# Patient Record
Sex: FEMALE | Race: WHITE | Employment: OTHER | URBAN - METROPOLITAN AREA
[De-identification: names, ages, dates, MRNs, and addresses within clinical notes are randomized per-mention and may not be internally consistent; named-entity substitution may affect disease eponyms.]

---

## 2017-07-21 ENCOUNTER — TRANSFERRED RECORDS (OUTPATIENT)
Dept: HEALTH INFORMATION MANAGEMENT | Facility: CLINIC | Age: 72
End: 2017-07-21

## 2017-07-22 ENCOUNTER — TELEPHONE (OUTPATIENT)
Dept: ONCOLOGY | Facility: CLINIC | Age: 72
End: 2017-07-22

## 2017-07-24 NOTE — TELEPHONE ENCOUNTER
Received a call from patient asking me to review outside labs.    Release of information signed.    Called pt to discuss results.    Ann Pham

## 2017-07-25 ENCOUNTER — APPOINTMENT (OUTPATIENT)
Dept: GENERAL RADIOLOGY | Facility: CLINIC | Age: 72
DRG: 372 | End: 2017-07-25
Attending: INTERNAL MEDICINE
Payer: MEDICARE

## 2017-07-25 ENCOUNTER — HOSPITAL ENCOUNTER (INPATIENT)
Facility: CLINIC | Age: 72
LOS: 6 days | Discharge: HOME | DRG: 372 | End: 2017-07-31
Attending: INTERNAL MEDICINE | Admitting: INTERNAL MEDICINE
Payer: MEDICARE

## 2017-07-25 ENCOUNTER — TELEPHONE (OUTPATIENT)
Dept: ONCOLOGY | Facility: CLINIC | Age: 72
End: 2017-07-25

## 2017-07-25 ENCOUNTER — APPOINTMENT (OUTPATIENT)
Dept: MRI IMAGING | Facility: CLINIC | Age: 72
DRG: 372 | End: 2017-07-25
Attending: INTERNAL MEDICINE
Payer: MEDICARE

## 2017-07-25 DIAGNOSIS — E87.79 OTHER HYPERVOLEMIA: ICD-10-CM

## 2017-07-25 DIAGNOSIS — E87.6 HYPOKALEMIA: ICD-10-CM

## 2017-07-25 DIAGNOSIS — R50.9 FEVER, UNSPECIFIED FEVER CAUSE: Primary | ICD-10-CM

## 2017-07-25 LAB
ALBUMIN SERPL-MCNC: 2.3 G/DL (ref 3.4–5)
ALBUMIN UR-MCNC: 10 MG/DL
ALP SERPL-CCNC: 594 U/L (ref 40–150)
ALT SERPL W P-5'-P-CCNC: 350 U/L (ref 0–50)
AMYLASE SERPL-CCNC: 21 U/L (ref 30–110)
ANION GAP SERPL CALCULATED.3IONS-SCNC: 8 MMOL/L (ref 3–14)
APPEARANCE UR: CLEAR
AST SERPL W P-5'-P-CCNC: 132 U/L (ref 0–45)
BILIRUB SERPL-MCNC: 2.8 MG/DL (ref 0.2–1.3)
BILIRUB UR QL STRIP: ABNORMAL
BUN SERPL-MCNC: 9 MG/DL (ref 7–30)
CALCIUM SERPL-MCNC: 8.3 MG/DL (ref 8.5–10.1)
CHLORIDE SERPL-SCNC: 104 MMOL/L (ref 94–109)
CO2 SERPL-SCNC: 26 MMOL/L (ref 20–32)
COLOR UR AUTO: ABNORMAL
CREAT SERPL-MCNC: 0.5 MG/DL (ref 0.52–1.04)
CRP SERPL-MCNC: 77 MG/L (ref 0–8)
ERYTHROCYTE [DISTWIDTH] IN BLOOD BY AUTOMATED COUNT: 14.5 % (ref 10–15)
GFR SERPL CREATININE-BSD FRML MDRD: >90 ML/MIN/1.7M2
GLUCOSE SERPL-MCNC: 96 MG/DL (ref 70–99)
GLUCOSE UR STRIP-MCNC: NEGATIVE MG/DL
HCT VFR BLD AUTO: 34.5 % (ref 35–47)
HGB BLD-MCNC: 11.5 G/DL (ref 11.7–15.7)
HGB UR QL STRIP: NEGATIVE
KETONES UR STRIP-MCNC: 5 MG/DL
LEUKOCYTE ESTERASE UR QL STRIP: NEGATIVE
LIPASE SERPL-CCNC: 134 U/L (ref 73–393)
MCH RBC QN AUTO: 29.2 PG (ref 26.5–33)
MCHC RBC AUTO-ENTMCNC: 33.3 G/DL (ref 31.5–36.5)
MCV RBC AUTO: 88 FL (ref 78–100)
MUCOUS THREADS #/AREA URNS LPF: PRESENT /LPF
NITRATE UR QL: NEGATIVE
PH UR STRIP: 6.5 PH (ref 5–7)
PLATELET # BLD AUTO: 244 10E9/L (ref 150–450)
POTASSIUM SERPL-SCNC: 3.5 MMOL/L (ref 3.4–5.3)
PROT SERPL-MCNC: 5.7 G/DL (ref 6.8–8.8)
RBC # BLD AUTO: 3.94 10E12/L (ref 3.8–5.2)
RBC #/AREA URNS AUTO: 1 /HPF (ref 0–2)
SODIUM SERPL-SCNC: 138 MMOL/L (ref 133–144)
SP GR UR STRIP: 1.02 (ref 1–1.03)
SQUAMOUS #/AREA URNS AUTO: <1 /HPF (ref 0–1)
TRANS CELLS #/AREA URNS HPF: <1 /HPF (ref 0–1)
URN SPEC COLLECT METH UR: ABNORMAL
UROBILINOGEN UR STRIP-MCNC: 2 MG/DL (ref 0–2)
WBC # BLD AUTO: 5.5 10E9/L (ref 4–11)
WBC #/AREA URNS AUTO: 3 /HPF (ref 0–2)

## 2017-07-25 PROCEDURE — 99223 1ST HOSP IP/OBS HIGH 75: CPT | Mod: AI | Performed by: INTERNAL MEDICINE

## 2017-07-25 PROCEDURE — 83690 ASSAY OF LIPASE: CPT | Performed by: STUDENT IN AN ORGANIZED HEALTH CARE EDUCATION/TRAINING PROGRAM

## 2017-07-25 PROCEDURE — 82150 ASSAY OF AMYLASE: CPT | Performed by: STUDENT IN AN ORGANIZED HEALTH CARE EDUCATION/TRAINING PROGRAM

## 2017-07-25 PROCEDURE — 36415 COLL VENOUS BLD VENIPUNCTURE: CPT | Performed by: STUDENT IN AN ORGANIZED HEALTH CARE EDUCATION/TRAINING PROGRAM

## 2017-07-25 PROCEDURE — 86140 C-REACTIVE PROTEIN: CPT | Performed by: STUDENT IN AN ORGANIZED HEALTH CARE EDUCATION/TRAINING PROGRAM

## 2017-07-25 PROCEDURE — 80053 COMPREHEN METABOLIC PANEL: CPT | Performed by: STUDENT IN AN ORGANIZED HEALTH CARE EDUCATION/TRAINING PROGRAM

## 2017-07-25 PROCEDURE — 25000128 H RX IP 250 OP 636: Performed by: INTERNAL MEDICINE

## 2017-07-25 PROCEDURE — 71010 XR CHEST PORT 1 VW: CPT

## 2017-07-25 PROCEDURE — 81001 URINALYSIS AUTO W/SCOPE: CPT | Performed by: STUDENT IN AN ORGANIZED HEALTH CARE EDUCATION/TRAINING PROGRAM

## 2017-07-25 PROCEDURE — 85027 COMPLETE CBC AUTOMATED: CPT | Performed by: STUDENT IN AN ORGANIZED HEALTH CARE EDUCATION/TRAINING PROGRAM

## 2017-07-25 PROCEDURE — 74183 MRI ABD W/O CNTR FLWD CNTR: CPT

## 2017-07-25 PROCEDURE — 93010 ELECTROCARDIOGRAM REPORT: CPT | Performed by: INTERNAL MEDICINE

## 2017-07-25 PROCEDURE — A9270 NON-COVERED ITEM OR SERVICE: HCPCS | Mod: GY | Performed by: STUDENT IN AN ORGANIZED HEALTH CARE EDUCATION/TRAINING PROGRAM

## 2017-07-25 PROCEDURE — 25000132 ZZH RX MED GY IP 250 OP 250 PS 637: Mod: GY | Performed by: STUDENT IN AN ORGANIZED HEALTH CARE EDUCATION/TRAINING PROGRAM

## 2017-07-25 PROCEDURE — 25000128 H RX IP 250 OP 636: Performed by: STUDENT IN AN ORGANIZED HEALTH CARE EDUCATION/TRAINING PROGRAM

## 2017-07-25 PROCEDURE — 20000002 ZZH R&B BMT INTERMEDIATE

## 2017-07-25 PROCEDURE — A9585 GADOBUTROL INJECTION: HCPCS | Performed by: INTERNAL MEDICINE

## 2017-07-25 PROCEDURE — 93005 ELECTROCARDIOGRAM TRACING: CPT

## 2017-07-25 PROCEDURE — 40000141 ZZH STATISTIC PERIPHERAL IV START W/O US GUIDANCE

## 2017-07-25 PROCEDURE — 86140 C-REACTIVE PROTEIN: CPT | Performed by: INTERNAL MEDICINE

## 2017-07-25 RX ORDER — ONDANSETRON 2 MG/ML
4 INJECTION INTRAMUSCULAR; INTRAVENOUS EVERY 6 HOURS PRN
Status: DISCONTINUED | OUTPATIENT
Start: 2017-07-25 | End: 2017-07-31 | Stop reason: HOSPADM

## 2017-07-25 RX ORDER — LIDOCAINE 40 MG/G
CREAM TOPICAL
Status: DISCONTINUED | OUTPATIENT
Start: 2017-07-25 | End: 2017-07-25

## 2017-07-25 RX ORDER — LIDOCAINE 40 MG/G
CREAM TOPICAL
Status: DISCONTINUED | OUTPATIENT
Start: 2017-07-25 | End: 2017-07-31 | Stop reason: HOSPADM

## 2017-07-25 RX ORDER — IBUPROFEN 200 MG
200 TABLET ORAL EVERY 6 HOURS PRN
Status: DISCONTINUED | OUTPATIENT
Start: 2017-07-25 | End: 2017-07-26

## 2017-07-25 RX ORDER — GADOBUTROL 604.72 MG/ML
7.5 INJECTION INTRAVENOUS ONCE
Status: COMPLETED | OUTPATIENT
Start: 2017-07-25 | End: 2017-07-25

## 2017-07-25 RX ORDER — SODIUM CHLORIDE 9 MG/ML
INJECTION, SOLUTION INTRAVENOUS CONTINUOUS
Status: DISCONTINUED | OUTPATIENT
Start: 2017-07-25 | End: 2017-07-28

## 2017-07-25 RX ORDER — IBUPROFEN 200 MG
200 TABLET ORAL EVERY 4 HOURS PRN
Status: DISCONTINUED | OUTPATIENT
Start: 2017-07-25 | End: 2017-07-25

## 2017-07-25 RX ORDER — IBUPROFEN 400 MG/1
400 TABLET, FILM COATED ORAL EVERY 4 HOURS PRN
Status: DISCONTINUED | OUTPATIENT
Start: 2017-07-25 | End: 2017-07-25

## 2017-07-25 RX ORDER — NALOXONE HYDROCHLORIDE 0.4 MG/ML
.1-.4 INJECTION, SOLUTION INTRAMUSCULAR; INTRAVENOUS; SUBCUTANEOUS
Status: DISCONTINUED | OUTPATIENT
Start: 2017-07-25 | End: 2017-07-31 | Stop reason: HOSPADM

## 2017-07-25 RX ORDER — DOXYCYCLINE 100 MG/1
100 CAPSULE ORAL 2 TIMES DAILY
Status: DISCONTINUED | OUTPATIENT
Start: 2017-07-25 | End: 2017-07-26

## 2017-07-25 RX ADMIN — DOXYCYCLINE HYCLATE 100 MG: 100 CAPSULE ORAL at 20:32

## 2017-07-25 RX ADMIN — SODIUM CHLORIDE: 9 INJECTION, SOLUTION INTRAVENOUS at 12:22

## 2017-07-25 RX ADMIN — GADOBUTROL 7.5 ML: 604.72 INJECTION INTRAVENOUS at 18:49

## 2017-07-25 RX ADMIN — SODIUM CHLORIDE 1000 ML: 9 INJECTION, SOLUTION INTRAVENOUS at 21:18

## 2017-07-25 RX ADMIN — IBUPROFEN 200 MG: 200 TABLET, FILM COATED ORAL at 15:09

## 2017-07-25 RX ADMIN — IBUPROFEN 200 MG: 200 TABLET, FILM COATED ORAL at 15:58

## 2017-07-25 RX ADMIN — Medication 1 MG: at 21:17

## 2017-07-25 ASSESSMENT — PAIN DESCRIPTION - DESCRIPTORS: DESCRIPTORS: ACHING

## 2017-07-25 NOTE — TELEPHONE ENCOUNTER
I received a call from patient with increasing fevers, body aches, chills.  These have been going on for the last 10+ days.  She has been on doxycycline since 7/20 7pm for presume anaplasmosis given leukopenia.  Recent travel to [place] and [place] (June for > 2 weeks), [place] (in July for > 10 days)  No improvement in symptoms.  Labs drawn yesterday at outside PCP physician office indicate plt wbc count 4.8  hgb 12.6 plt 215 (were 157)  ANC 3.6  ALC 0.4    LFTs returned this morning Total bilirubin 2.5 direct bilirubin 2.0      Last week. Tbili was normal.  Transaminases relatively stable.    Liver US last week at [facility] unremarkable.  Ehrlichia/anaplasmosis pcr negative  Smears - pending.      Discussed admitting patient with persistent fevers, increase bilirubin.    Ann Pham

## 2017-07-25 NOTE — H&P
"Internal Medicine History and Physical  Date of service: 2017  Attending physician: Vanessa Cuello       Patient: Tameka Hebert      : 1945      MRN: [id number]          Chief complaint:   Fevers           History of Present Illness:   Tameka Hebert is a 72 year old previously healthy female with a no significant past medical history who presented to [facility] w/ a ten day history of fevers and chills. Patient was with her family hiking in [place] two weeks ago when symptoms first developed. Patient reports going for a seven mile hike on  with her family in [place]. After the hike, she developed a headache, thought that she was dehydrated and drank some fluids. The next day (), the patient felt more tired than her baseline, reporting that later that afternoon she needed to nap which is \"something I never do.\" Upon awaking from her nap, patient felt feverish and with chills.     Her fevers and chills occur \"like clockwork\" ever 4-6 hours. She visited her PCP on  then returned to her PCP on ; CBC and LFTs at this 2nd visit were elevation and she was started on a 10 day course of doxycycline for a presumed tick borne illness. She continued to feel poorly and then presented to the [facility] on 17 after an episode of non-bilious, non-bloody vomiting. At the ED she was tested for lyme, hep A, B, C, anaplasma, erlichia, CMV, and EBV, all which were negative. Abdominal US at this time showed an incidental cyst in the left lobe of the liver. She was discharged home with recommendations to maintain hydration, continue ibuprofen for cyclical fevers, and continue doxycycline.     However, she did not improve over the weekend and returned to her PCP on  and liver function tests were again elevated (, , alk phos 717, and total bili 2.5 with direct bili 2.0). Her PCP subsequently admitted her directly to [facility].      She is also experiencing headaches that wax and wane with her " "fevers. The headaches are located at the back of her head bilaterally. Denies neck stiffness. She has also developed some arthralgias, mostly localized to the knee joints which is worse when the fever occurs. Her appetite has significantly decreased and she feels her po intake is not adequate.      Recent travel includes [place] from 07/08/17-06/17/17 and [place] from 06/9/17-07/23/17. She went hiking in both locations. She remembers getting a few mosquito bites but denies any new rash or skin lesions. She says ticks were present in [place] but she never found any on her self. No out of country travel recently. Denies sick contacts except her  who was treated treated for pneumonia while they were in [place]. Reports being up to date on her immunizations.  Denies weight loss, dysuria, hematuria, melena, hematochezia, light headedness, heart palpitations, cough, abdoiminal pain, lymphadenopathy, or paraesthesias.          Review of Symptoms:   10-point ROS reviewed, & found negative w/ exceptions noted in the HPI.          Past Medical History:   Denies significant past medical history besides a recent shoulder surgery for a torn rotator cuff.          Allergies:     Allergies   Allergen Reactions     Codeine Nausea and Vomiting             Outpatient Medications:   No regular home medications. Was taking doxycycline as an outpatient and on day 4.5/10 on admission.           Family History:   Mother with breast cancer and father with HTN.           Social History:    with 4 children and 7 grandchildren. Denies smoking. Is a social drinker. Has no pets but often takes care of her kids' dogs. Lives a very active lifestyle.           Physical Exam:   /64 (BP Location: Left arm)  Pulse 86  Temp 101.3  F (38.5  C) (Oral)  Resp 18  Ht 1.8 m (5' 10.87\")  Wt 65.7 kg (144 lb 13.5 oz)  SpO2 96%  BMI 20.28 kg/m2    General: Alert, not in respiratory or painful distress, appears mildly " uncomfortable   Head: NC/AT  EENT: anicteric sclera, PERRL, EOMI, MMM, OP unobstructed, w/o erythema/discharge;   Neck: no cervical LAD, FROM, no nuchal rigidity; chin to chest intact.   CV: RRR, nl S1/S2, no S3/S4 appreciated, no m/r/g  Lungs: CTAB, no wheezing/crackles, no cough  Abd: Non-distended. Bowel sounds presents; liver mildly enlarged on percussion but unable to palpate the liver edge. No splenomegaly.   Ext: WWP,  intact & symmetric 3+ pulses (radial, DP)  Skin: no rashes, cyanosis, or jaundice  Neuro: AOx3, CN II-XII intact and symmetric, muscle strength 5/5 bilaterally.           Data:   Labs (all laboratory studies reviewed by me):   Most pertinent for:    CMPNo lab results found in last 7 days.  CBC  Recent Labs  Lab 07/25/17  1324   WBC 5.5   RBC 3.94   HGB 11.5*   HCT 34.5*   MCV 88   MCH 29.2   MCHC 33.3   RDW 14.5                Assessment/Plan:   72 year old previously healthy female with no significant PMHx presenting w/ a 10 day history of cyclical fevers and chills.     #. Fever of unknown origin   Hemodynamically stable; does not meet SIRS or sepsis criteria currently. Workup at OSH negative for: lyme, anaplasma, ehrlichia, HIV, hepatitis panel, babesia, mononucleosis, [place], EBV/CMV. DDx includes infectious causes and rheumatologic causes. Vector borne disease still high on the differential 2/2 travel history, fevers/arthralgias, and elevated LFTs and AP. However, rheumatologic/connective tissue etiologies are also possible. Polymyalgia rheumatica and adult Still's disease are often associated with fever, but this patient lacks and changes in vision and muscle weakness seen in polymyalgia and laks the rash commonly seen in Still's disease. Lastly, malignancy such as those of reticuloendothelial origin (ie lymphoma, leukemia) is possible but given lack of additional b-symptoms or physical exam findings (ie no weight loss, progressive fatigue, lymphadenopathy) makes this less  likely. Additionally, her WBC has not been elevated over the course of the week but has had lymphopenia and thrombocytopenia at an OSH; this has resolved on her admission but makes an overt infectious cause 2/2 bacteria less likely. Drugs are another cause of fevers but this patient has no home medications.   - BETH, RF pending  - CRP, ESR pending   - CK pending  - LDH pending   - Quant gold pending   - UA w/ reflex to culture pending  - BCx pending   - IVF with  cc/hr   - ID consulted: coxiella, CMV, EBV, francisella tularensis, rickettsia, repeat hepatic panel, pro-calcitonin, and parasite strain ordered   - GI consulted: MRCP planned for 07/25/17 in evening   - Ibuprofen prn fevers >101 degrees; will need to map fever curve   - Continue doxycycline 100mg BID for total of 10 day course (5 days completed to date, end-date 07/30/17)     FEN:  -- IVF:  cc/hr  -- Labs: Routine  -- Diet: Full    PPX:   -- Ulcer: No PPI indicated at this time  -- VTE: Mechanical prophylaxis     Code Status: FULL    Dispo: Admit to inpatient for workup of fevers of unknown origin     Patient seen and discussed with Dr. Cuello, who is in agreement with my assessment and recommedations. Please, see staff addendum for changes/additions to the plan.    Cee Partida MD/MPH  Internal Medicine/Dermatology  PGY-1  [phone]

## 2017-07-25 NOTE — PROGRESS NOTES
Date of call: 7/25/2017    Time of call: 7:51 AM      Reason for Transfer:  Further diagnostic work up, management, and consultation for specialized care  Diagnosis: Fever of Unknown Origin    Outside Records: available in Care Everywhere    Stability of Patient: Patient is vitally stable, with no critical labs, and will likely remain stable throughout the transfer process    Transferring facility/location: coming from home    Expected Time of Arrival: pending bed availability    Recommendations for Management and Stabilization: None Given    HPI obtained from transferring provider 73 yo previously healthy woman  With 10 days of fever and chills, now with transaminase elevation and hyperbilirubinemia. Outpatient w/u including Abdominal CT, ehrlichia and anaplasmosis PCRs and Blood Cultures have been unremarkable to date.  Failed a course of doxycycline. Vitally stable and mentating normally, but recent increase in LFTs prompting further investigation.      Bernard Carlson MD  Internal Medicine and Pediatrics Hospitalist

## 2017-07-25 NOTE — IP AVS SNAPSHOT
MRN:[id number]                      After Visit Summary   7/25/2017    Tameka Hebert    MRN: [id number]           Thank you!     Thank you for choosing [facility] for your care. Our goal is always to provide you with excellent care. Hearing back from our patients is one way we can continue to improve our services. Please take a few minutes to complete the written survey that you may receive in the mail after you visit with us. Thank you!        Patient Information     Date Of Birth          1945        Designated Caregiver       Most Recent Value    Caregiver    Will someone help with your care after discharge? yes    Name of designated caregiver Andrew    Phone number of caregiver [phone]    Caregiver address [address], [place]      About your hospital stay     You were admitted on:  July 25, 2017 You last received care in the:  [address] [facility]    You were discharged on:  July 31, 2017        Reason for your hospital stay       You were admitted for fever of unknown origin. You came in on doxycycline since your fever was thought to be from a tick-borne disease. However, all the tests for tick-born illnesses were negative so the doxycycline was stopped and you were switched to different antibiotics. Infectious disease, gastroenterology, and hepatology were consulted during your stay. Infection due to a non-typhoidal strain of Salmonella was ultimately thought to be the cause. Your liver enzymes down-trended during your stay and fevers became less frequent. You received IV fluids early on in your stay for dehydration and are now on lasix to relieve some of the fluid that accumulated. You are going home on antibiotics by mouth.                  Who to Call     For medical emergencies, please call [phone].  For non-urgent questions about your medical care, please call your primary care provider or clinic, [phone]          Attending Provider     Provider Specialty     "Bernard Carlson MD Pediatrics    Vanessa Cuello MD Internal Medicine       Primary Care Provider Office Phone # Fax #    Obed Salgado [phone]      After Care Instructions     Activity       Your activity upon discharge: activity as tolerated            Diet       Follow this diet upon discharge: Regular                  Follow-up Appointments     Follow Up and recommended labs and tests       Follow up with primary care provider, Obed Salgado, within 7 days for hospital follow- up.  The following labs/tests are recommended: CBC and CMP.                  Future tests that were ordered for you     CRP inflammation           Comprehensive metabolic panel                 Pending Results     Date and Time Order Name Status Description    7/31/2017 0150 Bartonella Species by PCR In process     7/30/2017 2353 B Henselae antibody IgG and IgM In process     7/30/2017 2353 Brucella species antibody In process     7/30/2017 0507 Blood culture Preliminary     7/28/2017 0822 EKG 12-lead, complete Preliminary     7/28/2017 0547 Blood culture Preliminary     7/27/2017 1041 Jessica Barr Virus Qualitative PCR In process     7/25/2017 2200 Blood culture Preliminary             Statement of Approval     Ordered          07/31/17 1518  I have reviewed and agree with all the recommendations and orders detailed in this document.  EFFECTIVE NOW     Approved and electronically signed by:  Cee Partida MD             Admission Information     Date & Time Provider Department Dept. Phone    7/25/2017 Vanessa Cuello MD [address] [facility] [place] [phone]      Your Vitals Were     Blood Pressure Pulse Temperature Respirations Height Weight    126/76 (BP Location: Right arm) 85 100.3  F (37.9  C) (Temporal) 18 1.8 m (5' 10.87\") 68.4 kg (150 lb 14.4 oz)    Pulse Oximetry BMI (Body Mass Index)                95% 21.13 kg/m2          MyChart Information     [facility] gives you secure access to your electronic " health record. If you see a primary care provider, you can also send messages to your care team and make appointments. If you have questions, please call your primary care clinic.  If you do not have a primary care provider, please call [phone] and they will assist you.        Care EveryWhere ID     This is your Care EveryWhere ID. This could be used by other organizations to access your [facility] medical records  [id number]        Equal Access to Services     LARRY VARGAS : Alicja López, wanajmada mariposaadaha, qaybta kaalmada maribel, sandra yeung . So [facility] [phone].    ATENCIÓN: Si christellela espradha, tiene a stern disposición servicios gratuitos de asistencia lingüística. Abelardo al [phone].    We comply with applicable federal civil rights laws and [place] laws. We do not discriminate on the basis of race, color, national origin, age, disability sex, sexual orientation or gender identity.               Review of your medicines      START taking        Dose / Directions    ciprofloxacin 500 MG tablet   Commonly known as:  CIPRO        Dose:  500 mg   Take 1 tablet (500 mg) by mouth 2 times daily   Quantity:  17 tablet   Refills:  0       furosemide 20 MG tablet   Commonly known as:  LASIX   Used for:  Other hypervolemia        Dose:  20 mg   Take 1 tablet (20 mg) by mouth daily for 5 days   Quantity:  5 tablet   Refills:  0       potassium chloride 20 MEQ Packet   Commonly known as:  KLOR-CON   Used for:  Hypokalemia        Dose:  20 mEq   Take 20 mEq by mouth daily   Quantity:  5 tablet   Refills:  0       ursodiol 250 MG tablet   Commonly known as:  ACTIGALL        Dose:  250 mg   Take 1 tablet (250 mg) by mouth 2 times daily for 5 days   Quantity:  10 tablet   Refills:  0         STOP taking     DOXYCYCLINE HYCLATE PO           IBUPROFEN PO                Where to get your medicines      These medications were sent to [facility] -  [place], [place] - [address]  [address] [address]     Phone:  [phone]     ciprofloxacin 500 MG tablet    furosemide 20 MG tablet    potassium chloride 20 MEQ Packet    ursodiol 250 MG tablet                Protect others around you: Learn how to safely use, store and throw away your medicines at www.disposemymeds.org.             Medication List: This is a list of all your medications and when to take them. Check marks below indicate your daily home schedule. Keep this list as a reference.      Medications           Morning Afternoon Evening Bedtime As Needed    ciprofloxacin 500 MG tablet   Commonly known as:  CIPRO   Take 1 tablet (500 mg) by mouth 2 times daily                                furosemide 20 MG tablet   Commonly known as:  LASIX   Take 1 tablet (20 mg) by mouth daily for 5 days                                potassium chloride 20 MEQ Packet   Commonly known as:  KLOR-CON   Take 20 mEq by mouth daily                                ursodiol 250 MG tablet   Commonly known as:  ACTIGALL   Take 1 tablet (250 mg) by mouth 2 times daily for 5 days   Last time this was given:  250 mg on 7/31/2017  8:25 AM

## 2017-07-25 NOTE — IP AVS SNAPSHOT
[address] [address]    Phone:  [phone]    Fax:  [phone]                                       After Visit Summary   7/25/2017    Tameka Hebert    MRN: [id number]           After Visit Summary Signature Page     I have received my discharge instructions, and my questions have been answered. I have discussed any challenges I see with this plan with the nurse or doctor.    ..........................................................................................................................................  Patient/Patient Representative Signature      ..........................................................................................................................................  Patient Representative Print Name and Relationship to Patient    ..................................................               ................................................  Date                                            Time    ..........................................................................................................................................  Reviewed by Signature/Title    ...................................................              ..............................................  Date                                                            Time

## 2017-07-25 NOTE — PLAN OF CARE
Problem: Goal Outcome Summary  Goal: Goal Outcome Summary  Outcome: No Change  Pt admitted from home mid-morning. Tmax 100.9.  Other VSS. C/o headache- given ibuprofen w/ relief. Got MRCP and Chest XR.  Awaiting EKG and UA/UC this evening.  GI and ID consults tomorrow- orders for lab draws placed. Doxycylcine ordered for this evening.  Also prn melatonin ordered for HS.  Pt denies further nursing needs at this time.  Continue POC.

## 2017-07-25 NOTE — H&P
"[facility]    Internal Medicine History and Physical - [facility] Service       Date of Admission:  7/25/2017    Chief Complaint   Recurrent fevers    History is obtained from the patient and from chart review.     History of Present Illness   Tameka Hebert is an otherwise healthy 72 year old female with a recent travel history to [place] and [place] who presents with a 2 week onset of cyclical fevers. Patient was with her family hiking in [place] two weeks ago when symptoms first developed. Patient reports going for a seven mile hike on 7/8 with her family in [place]. After the hike, she developed a headache, thought that she was dehydrated and drank some fluids. The next day (7/9), the patient felt more tired than her baseline, reporting that later that afternoon she needed to nap which is \"something I never do.\" Upon awaking from her nap, patient felt feverish and with chills. Fevers and chills continued in a cyclical manner, ocurring every 4-6 hours with chills following after every febrile episode. Patient also shares that she has been sweating along with the fevers. On 7/17, the patient was not feeling any better and visited her primary care physician with the understanding that she was most likely dealing with a viral illness. Patient continued to not improve and then presented back to her PCP on Thursday 7/20 in which a CBC and liver enzyme tests were drawn and patient was started on doxycycline for possible tickborne infection. Liver enzymes returned elevated (, , alk phos 465) and then on 7/21, she presented to the ED at [facility] with one episode of nonbloody nonbilious vomiting. ED tested for lyme, hep A, B, C, anaplasma, erlichia, CMV, and EBV, all which were negative. An AB-US was performed which was unremarkable. Patient was discharged to home with recommendations to maintain hydration, continue ibuprofen for cyclical fevers, and continue " doxycycline course. Patient continued to not improve over the weekend and returned to her PCP on 7.24 where liver enzymes were again elevated (, , alk phos 717, and total bili 2.5 with direct bili 2.0). Given concern for elevated liver enzymes in the setting of cyclical fever with chills and sweats, patient was directly admitted to the hospital here.     Patient denies any recent sick contacts other than her  who developed CAP while in [place]. Denies any recent animal exposures or insect bites other than one mosquito bite on her abdomen. She did note that when she was in [place], she was doing a lot of yard work and found many ticks on her grandkids though none on herself. No international travel history, recent change in diet, or new environmental exposures. Denies weight loss, dysuria, hematuria, melena, hematochezia, light headedness, heart palpitations, cough, abdoiminal pain, lymphadenopathy, or paraesthesias. Patient did say that within the last few days she has developed worsening headaches that wax and wane with each fever cycle. Also shared that with each fever cycle, she will occasionally experience shooting jolts of left sided chest pain that also resolve after the fever. She has also developed some arthralgias, mostly localized to the knee joints which is worse when the fever occurs. Appetite has decreased over these two weeks along with fluid intake. Patient was feeling overwhelmed during the interview since she has been otherwise healthy before this recent event and has not received a clear idea as to what keep causing theses fevers.     Review of Systems   Complete ROS of systems was negative other than what was previously discussed in the HPI.     Past Medical History    Osteopenia    Past Surgical History   Left Rotator Cuff Repair - 2016    Social History   Lives near [facility], is , has 4 children and 7 grandchildren. She leads a very active lifestyle and  has no major health complaints. At baseline, patient walks three miles a day. She enjoys being outdoors and active with her grandchildren.     Family History   Sister diagnosed with breast cancer - age 70   Mother diagnosed with breast cancer - age 80   Father - HTN/kidney failure, Hx of smoking     Prior to Admission Medications   Prior to Admission Medications   Prescriptions Last Dose Informant Patient Reported? Taking?   DOXYCYCLINE HYCLATE PO 7/24/2017 at Unknown time  Yes Yes   Sig: Take 100 mg by mouth 2 times daily   IBUPROFEN PO 7/25/2017 at 0300  Yes Yes   Sig: Take 400 mg by mouth every 6 hours as needed for moderate pain      Facility-Administered Medications: None     Allergies   Allergies   Allergen Reactions     Codeine Nausea and Vomiting       Physical Exam   Vital Signs: Temp: 100.7  F (38.2  C) Temp src: Oral BP: 116/65 Pulse: 89   Resp: 18 SpO2: 96 % O2 Device: None (Room air)    Weight: 144 lbs 13.48 oz    General Appearance: alert and oriented x3, conversant, very articulate in sharing the recent details of her course of illness   Eyes: anicteric, PERRLA, EOM in tact   HEENT: no posterior pharyngeal erythema, mucous membranes moist, no maxillary or frontal sinus tenderness, hearing grossly in tact, no cervical lymphadenopathy  Respiratory: CTAB, appropriate airflow in an out of lungs   Cardiovascular: normal S1 and S2, no r/m/g, RRR  GI: nontender to palptation, nondistended, soft, +BSx4  Lymph/Hematologic: no lymphadenopathy appreciated  Genitourinary: not examined  Skin: no rashes, bites, or bruises noted on back, chest, abdomen, or extremities  Neurologic: CN II-XII intact, 5/5 strength in upper and lower limbs, vibratory/position sense not assessed, gait not assessed    Assessment & Plan   Tameka Hebert is a previously healthy 72 year old female admitted on 7/25/2017 with a two week history of cyclical fevers and elevated LFTs which began after recent travel to [place] coupled  with arthralgias, chills, and sweats.     #fever of unknown origin with elevated LFTs: differential diagnosis includes infectious vs. autoimmune vs. Malignancy. Infectious seems like the most likely etiology given the acute onset of the patient's presentation when in [place] along with a negative history for any other symptoms prior to her travel. Both her stay in [place] and [place] could have been likely sources for possible infections. However, given recent history of visits to [facility]CP and [facility] and negative workup so far for lyme, anaplasma, ehrlichia, HIV, Hep panel, and no report of improvement on doxycycline since beginning the course on 7/20, infectious etiology is uncertain. Less likely etiologies include autoimmune or possible malignancy, though wasn't able to appreciate any rashes, or lymphadenopathy.  1) CBC with diff and CMP  2) three blood cultures, to be drawn when patient has fever   3) BETH, Rf, ESR, CRP, CPK, Quant gold, procal, and LDH labs ordered  4) GI and ID consult     Diet: Room Service  Regular Diet Adult  Fluids: NS   DVT Prophylaxis: mechanical prophylaxis   Code Status: full code     Disposition Plan   Expected discharge: likely 3 days pending return of labs and information obtained from GI and ID consults.        Entered: Romero Lopez 07/25/2017, 4:43 PM   Information in the above section will display in the discharge planner report.    The patient was discussed with Dr. Cuello and Dr. Susanna Lopez  [address]   Pager: [phone]  Please see sticky note for cross cover information      Data   Data     Recent Labs  Lab 07/25/17  1324   WBC 5.5   HGB 11.5*   MCV 88         POTASSIUM 3.5   CHLORIDE 104   CO2 26   BUN 9   CR 0.50*   ANIONGAP 8   ROSANA 8.3*   GLC 96   ALBUMIN 2.3*   PROTTOTAL 5.7*   BILITOTAL 2.8*   ALKPHOS 594*   *   *     No results found for this or any previous visit (from the past 24  hour(s)).

## 2017-07-25 NOTE — LETTER
Transition Communication Hand-off for Care Transitions to Next Level of Care Provider    Name: Tameka Hebert  MRN #: [id number]  Primary Care Provider: Obed Salgado     Primary Clinic: [facility] [address] [place] [id number]     Reason for Hospitalization:  fever   elevated billirubin  Fever  Admit Date/Time: 7/25/2017  9:03 AM  Discharge Date: 7/31/2017  Payor Source: Payor: BCBS / Plan: BCBS PLATINUM BLUE / Product Type: PPO /          Reason for Communication Hand-off Referral: Other Continuity of care       Concern for non-adherence with plan of care:   No  Follow-up specialty is recommended: No    Follow-up plan:  No future appointments.          Key Recommendations:    Follow up with primary care provider, Obed Salgado, within 7 days for hospital follow- up.  The following labs/tests are recommended: CBC and CMP.     Katherin Zaidi  RN Care Coordinator  [phone]  AVS/Discharge Summary is the source of truth; this is a helpful guide for improved communication of patient story

## 2017-07-26 ENCOUNTER — APPOINTMENT (OUTPATIENT)
Dept: NUCLEAR MEDICINE | Facility: CLINIC | Age: 72
DRG: 372 | End: 2017-07-26
Attending: INTERNAL MEDICINE
Payer: MEDICARE

## 2017-07-26 LAB
ALBUMIN SERPL-MCNC: 2 G/DL (ref 3.4–5)
ALP SERPL-CCNC: 534 U/L (ref 40–150)
ALT SERPL W P-5'-P-CCNC: 308 U/L (ref 0–50)
ANION GAP SERPL CALCULATED.3IONS-SCNC: 11 MMOL/L (ref 3–14)
AST SERPL W P-5'-P-CCNC: 145 U/L (ref 0–45)
BILIRUB DIRECT SERPL-MCNC: 2.4 MG/DL (ref 0–0.2)
BILIRUB SERPL-MCNC: 3.1 MG/DL (ref 0.2–1.3)
BUN SERPL-MCNC: 11 MG/DL (ref 7–30)
CALCIUM SERPL-MCNC: 7.9 MG/DL (ref 8.5–10.1)
CHLORIDE SERPL-SCNC: 104 MMOL/L (ref 94–109)
CK SERPL-CCNC: 16 U/L (ref 30–225)
CMV IGM SERPL QL IA: NORMAL AI (ref 0–0.8)
CO2 SERPL-SCNC: 20 MMOL/L (ref 20–32)
CREAT SERPL-MCNC: 0.53 MG/DL (ref 0.52–1.04)
CRP SERPL-MCNC: 69 MG/L (ref 0–8)
EBV VCA IGM SER QL IA: NORMAL AI (ref 0–0.8)
ERYTHROCYTE [SEDIMENTATION RATE] IN BLOOD BY WESTERGREN METHOD: 27 MM/H (ref 0–30)
GFR SERPL CREATININE-BSD FRML MDRD: ABNORMAL ML/MIN/1.7M2
GLUCOSE SERPL-MCNC: 81 MG/DL (ref 70–99)
INTERPRETATION ECG - MUSE: NORMAL
LDH SERPL L TO P-CCNC: 181 U/L (ref 81–234)
MICRO REPORT STATUS: NORMAL
PARASITE SPEC INSPECT: NORMAL
POTASSIUM SERPL-SCNC: 3.7 MMOL/L (ref 3.4–5.3)
PROCALCITONIN SERPL-MCNC: 0.64 NG/ML
PROT SERPL-MCNC: 5 G/DL (ref 6.8–8.8)
SODIUM SERPL-SCNC: 135 MMOL/L (ref 133–144)
SPECIMEN SOURCE: NORMAL

## 2017-07-26 PROCEDURE — 86039 ANTINUCLEAR ANTIBODIES (ANA): CPT | Performed by: INTERNAL MEDICINE

## 2017-07-26 PROCEDURE — 86665 EPSTEIN-BARR CAPSID VCA: CPT | Performed by: INTERNAL MEDICINE

## 2017-07-26 PROCEDURE — 25000132 ZZH RX MED GY IP 250 OP 250 PS 637: Mod: GY | Performed by: STUDENT IN AN ORGANIZED HEALTH CARE EDUCATION/TRAINING PROGRAM

## 2017-07-26 PROCEDURE — 78226 HEPATOBILIARY SYSTEM IMAGING: CPT

## 2017-07-26 PROCEDURE — 20000002 ZZH R&B BMT INTERMEDIATE

## 2017-07-26 PROCEDURE — 80076 HEPATIC FUNCTION PANEL: CPT | Performed by: INTERNAL MEDICINE

## 2017-07-26 PROCEDURE — 86757 RICKETTSIA ANTIBODY: CPT | Performed by: INTERNAL MEDICINE

## 2017-07-26 PROCEDURE — 86638 Q FEVER ANTIBODY: CPT | Performed by: INTERNAL MEDICINE

## 2017-07-26 PROCEDURE — 86431 RHEUMATOID FACTOR QUANT: CPT | Performed by: INTERNAL MEDICINE

## 2017-07-26 PROCEDURE — 34300033 ZZH RX 343: Performed by: INTERNAL MEDICINE

## 2017-07-26 PROCEDURE — 25000128 H RX IP 250 OP 636: Performed by: INTERNAL MEDICINE

## 2017-07-26 PROCEDURE — 82550 ASSAY OF CK (CPK): CPT | Performed by: INTERNAL MEDICINE

## 2017-07-26 PROCEDURE — A9270 NON-COVERED ITEM OR SERVICE: HCPCS | Mod: GY | Performed by: STUDENT IN AN ORGANIZED HEALTH CARE EDUCATION/TRAINING PROGRAM

## 2017-07-26 PROCEDURE — 83615 LACTATE (LD) (LDH) ENZYME: CPT | Performed by: INTERNAL MEDICINE

## 2017-07-26 PROCEDURE — 25000128 H RX IP 250 OP 636: Performed by: STUDENT IN AN ORGANIZED HEALTH CARE EDUCATION/TRAINING PROGRAM

## 2017-07-26 PROCEDURE — 87207 SMEAR SPECIAL STAIN: CPT | Performed by: INTERNAL MEDICINE

## 2017-07-26 PROCEDURE — 87015 SPECIMEN INFECT AGNT CONCNTJ: CPT | Performed by: INTERNAL MEDICINE

## 2017-07-26 PROCEDURE — 40000141 ZZH STATISTIC PERIPHERAL IV START W/O US GUIDANCE

## 2017-07-26 PROCEDURE — 84145 PROCALCITONIN (PCT): CPT | Performed by: INTERNAL MEDICINE

## 2017-07-26 PROCEDURE — 86645 CMV ANTIBODY IGM: CPT | Performed by: INTERNAL MEDICINE

## 2017-07-26 PROCEDURE — 99232 SBSQ HOSP IP/OBS MODERATE 35: CPT | Mod: GC | Performed by: INTERNAL MEDICINE

## 2017-07-26 PROCEDURE — 87040 BLOOD CULTURE FOR BACTERIA: CPT | Performed by: INTERNAL MEDICINE

## 2017-07-26 PROCEDURE — 80048 BASIC METABOLIC PNL TOTAL CA: CPT | Performed by: INTERNAL MEDICINE

## 2017-07-26 PROCEDURE — 85652 RBC SED RATE AUTOMATED: CPT | Performed by: INTERNAL MEDICINE

## 2017-07-26 PROCEDURE — A9537 TC99M MEBROFENIN: HCPCS | Performed by: INTERNAL MEDICINE

## 2017-07-26 PROCEDURE — 86668 FRANCISELLA TULARENSIS: CPT | Performed by: INTERNAL MEDICINE

## 2017-07-26 PROCEDURE — 86140 C-REACTIVE PROTEIN: CPT | Performed by: INTERNAL MEDICINE

## 2017-07-26 PROCEDURE — 40000556 ZZH STATISTIC PERIPHERAL IV START W US GUIDANCE

## 2017-07-26 RX ORDER — IBUPROFEN 400 MG/1
400 TABLET, FILM COATED ORAL EVERY 6 HOURS PRN
Status: DISCONTINUED | OUTPATIENT
Start: 2017-07-26 | End: 2017-07-27

## 2017-07-26 RX ORDER — HYDROXYZINE HYDROCHLORIDE 25 MG/1
50 TABLET, FILM COATED ORAL ONCE
Status: COMPLETED | OUTPATIENT
Start: 2017-07-26 | End: 2017-07-26

## 2017-07-26 RX ORDER — KIT FOR THE PREPARATION OF TECHNETIUM TC 99M MEBROFENIN 45 MG/10ML
5 INJECTION, POWDER, LYOPHILIZED, FOR SOLUTION INTRAVENOUS ONCE
Status: COMPLETED | OUTPATIENT
Start: 2017-07-26 | End: 2017-07-26

## 2017-07-26 RX ORDER — CIPROFLOXACIN 2 MG/ML
400 INJECTION, SOLUTION INTRAVENOUS EVERY 12 HOURS
Status: DISCONTINUED | OUTPATIENT
Start: 2017-07-26 | End: 2017-07-31 | Stop reason: HOSPADM

## 2017-07-26 RX ORDER — MORPHINE SULFATE 2 MG/ML
0.04 INJECTION, SOLUTION INTRAMUSCULAR; INTRAVENOUS ONCE
Status: COMPLETED | OUTPATIENT
Start: 2017-07-26 | End: 2017-07-26

## 2017-07-26 RX ADMIN — SODIUM CHLORIDE 1000 ML: 9 INJECTION, SOLUTION INTRAVENOUS at 12:38

## 2017-07-26 RX ADMIN — HYDROXYZINE HYDROCHLORIDE 25 MG: 25 TABLET ORAL at 20:39

## 2017-07-26 RX ADMIN — MEBROFENIN 5.3 MILLICURIE: 45 INJECTION, POWDER, LYOPHILIZED, FOR SOLUTION INTRAVENOUS at 16:45

## 2017-07-26 RX ADMIN — DOXYCYCLINE HYCLATE 100 MG: 100 CAPSULE ORAL at 08:56

## 2017-07-26 RX ADMIN — ONDANSETRON 4 MG: 2 INJECTION INTRAMUSCULAR; INTRAVENOUS at 09:36

## 2017-07-26 RX ADMIN — MORPHINE SULFATE 2 MG: 2 INJECTION, SOLUTION INTRAMUSCULAR; INTRAVENOUS at 15:44

## 2017-07-26 RX ADMIN — SODIUM CHLORIDE 1000 ML: 9 INJECTION, SOLUTION INTRAVENOUS at 04:23

## 2017-07-26 RX ADMIN — ONDANSETRON 4 MG: 2 INJECTION INTRAMUSCULAR; INTRAVENOUS at 20:44

## 2017-07-26 RX ADMIN — Medication 1 MG: at 04:14

## 2017-07-26 RX ADMIN — CIPROFLOXACIN 400 MG: 2 INJECTION, SOLUTION INTRAVENOUS at 12:37

## 2017-07-26 RX ADMIN — IBUPROFEN 400 MG: 400 TABLET ORAL at 10:51

## 2017-07-26 RX ADMIN — CIPROFLOXACIN 400 MG: 2 INJECTION, SOLUTION INTRAVENOUS at 23:20

## 2017-07-26 ASSESSMENT — PAIN DESCRIPTION - DESCRIPTORS: DESCRIPTORS: ACHING

## 2017-07-26 NOTE — PROGRESS NOTES
[facility] Internal Medicine Progress Note - [facility]    Tameka Hebert MRN# [id number]   Age: 72 year old YOB: 1945           Assessment and Plan:   Assessment:  Tameka Hebert is a 72 year old with no significant past medical history who presents with fevers, chills, and elevated liver enzymes.     Plan:  Today:  - HIDA scan today 07/26/17   - D/c doxycycline  - Start ciprofloxacin   - 1L fluid bolus NS given   - Ibuprofen 400mg Q6H prn pain or fever   - F/U with GI for recs regarding possible cholecystitis     #. Fever of unknown origin   #. Pericystic inflammatory changes of the gallbladder   Hemodynamically stable; does not meet SIRS or sepsis criteria currently. Workup at OSH negative for: lyme, anaplasma, ehrlichia, HIV, hepatitis panel, babesia, mononucleosis, [place], EBV/CMV. After MRI abdomen showing pericystic inflammation and little response to doxycycline, vector borne diseases are less likely and food borne illnesses like salmonella is more likely per ID. Rheumatologic causes are still possible and labs pending.    - BETH, RF pending  - CRP elevated but ESR wnl , CK wnl  - CMV and EBV negative   - No anaplasma, babesia, or ehrlichia found on parasite smear   - BCx negative to date   - LDH pending   - Quant gold pending   - UA not suspicious for UTI   - IVF with  cc/hr   - GI recommendations pending. Will get HIDA scan to r/o cholecystitis, although suspicion is low in setting no Cortés sign or abdominal pain.    - Start ciprofloxacin per ID   - D/C doxycycline   - Trend CRP in am     FEN: IVF w/  cc/hr, full diet   PPX: No PPI indicated at this time, mechanical prophylaxis   CODE: FULL      Patient was discussed with staff attending, Dr. Cuello, who agrees with the above assessment & plan    Cee Partida MD/MPH  Internal Medicine/Dermatology   PGY-1  Pager: [phone]        Interval History:   Nursing notes reviewed and noted. Patient feels ok this morning  "but is still experiencing some nausea. She had an episode of emesis at 1am in the morning and another around 8am. She was able to tolerate small amounts of bland food last night but had some popcicles. Last night she also experienced rigors, but no fevers.     4 point ROS including Respiratory, CV, GI and , other than that noted in the HPI,  is negative          Medications:     Current Facility-Administered Medications   Medication     ibuprofen (ADVIL/MOTRIN) tablet 400 mg     ciprofloxacin (CIPRO) infusion 400 mg     naloxone (NARCAN) injection 0.1-0.4 mg     0.9% sodium chloride infusion     sodium chloride (PF) 0.9% PF flush 3 mL     sodium chloride (PF) 0.9% PF flush 3 mL     lidocaine 1 % 1 mL     lidocaine (LMX4) kit     sodium chloride (PF) 0.9% PF flush 3 mL     sodium chloride (PF) 0.9% PF flush 3 mL     melatonin tablet 1 mg     ondansetron (ZOFRAN) injection 4 mg     sodium chloride (PF) 0.9% PF flush 60 mL             Physical Exam:   Vitals were reviewed  Blood pressure 109/58, pulse 77, temperature 100.4  F (38  C), temperature source Axillary, resp. rate 18, height 1.8 m (5' 10.87\"), weight 68.6 kg (151 lb 3.2 oz), SpO2 94 %.    Intake/Output Summary (Last 24 hours) at 07/26/17 1519  Last data filed at 07/26/17 1125   Gross per 24 hour   Intake           2727.5 ml   Output              626 ml   Net           2101.5 ml     Vitals:    07/25/17 0916 07/26/17 0854   Weight: 65.7 kg (144 lb 13.5 oz) 68.6 kg (151 lb 3.2 oz)       Physical Exam:  General: Alert, not in respiratory or painful distress,   HEENT: anicteric sclera, PERRL, EOMI, mucous membranes dry, OP unobstructed, w/o erythema/discharge; no cervical LAD, FROM  CV: RRR, nl S1/S2, no S3/S4 appreciated, no m/r/g; intact & symmetric 3+ pulses (radial, DP)  Lungs: CTAB, no wheezing/crackles, no cough  Abd: Obese/full/flat, soft/tense, moves with respiration, BS+/-, not tender, not distended, no palpable organomegaly, no masses   Ext: WWP, no " BLE edema  Skin: no rashes, cyanosis, or jaundice  Neuro: AOx3, CN II-XII intact and symmetric, No focal neurologic deficits           Data:   ROUTINE LABS (Last four results)  CMP  Recent Labs  Lab 07/26/17  0419 07/25/17  1324    138   POTASSIUM 3.7 3.5   CHLORIDE 104 104   CO2 20 26   ANIONGAP 11 8   GLC 81 96   BUN 11 9   CR 0.53 0.50*   GFRESTIMATED >90Non  GFR Calc >90   GFRESTBLACK >90African American GFR Calc >90   ROSANA 7.9* 8.3*   PROTTOTAL 5.0* 5.7*   ALBUMIN 2.0* 2.3*   BILITOTAL 3.1* 2.8*   ALKPHOS 534* 594*   * 132*   * 350*     CBC  Recent Labs  Lab 07/25/17  1324   WBC 5.5   RBC 3.94   HGB 11.5*   HCT 34.5*   MCV 88   MCH 29.2   MCHC 33.3   RDW 14.5        INRNo lab results found in last 7 days.  Arterial Blood GasNo lab results found in last 7 days.  I&O's: I/O last 3 completed shifts:  In: 2852.5 [P.O.:540; I.V.:2312.5]  Out: 626 [Urine:625; Emesis/NG output:1]    #. MRI Abdomen 07/25/17   IMPRESSION:   1.  Cholelithiasis and moderate amount of pericholecystic inflammatory  changes and fluid. While no obstructing stone is identified,  correlation for symptoms of right upper quadrant pain and potentially  right upper quadrant ultrasound would be beneficial.  2. Mild hepatomegaly with elongated left lobe of the liver extending  up to the left upper quadrant. Scattered liver cysts. No solid focal  liver lesions.  3. 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 Recommend follow-up according to criteria described below in  the present report.      Physician Attestation  I, Vanessa Cuello MD, saw this patient with the resident and agree with the resident s findings and plan of care as documented in the resident s note with my edits.     I personally reviewed vital signs, medications, labs and imaging.    Vanessa Cuello MD  Date of Service (when I saw the patient): 7/26/17

## 2017-07-26 NOTE — CONSULTS
[facility]    Hepatology Consult    Requesting provider: Dr. Cuello    Consult requested for: acute hepatitis      HPI:  72 year old female with no significant past medical history, presented with fevers and elevated LFTs. She is at baseline very healthy, doesn't take any medications. She recently returned from several trips, including trips to [place] and [place]. She did hiking as well as some landscaping for her relatives out there. She did take off a few ticks from her relatives but she never saw any on her own body, she never saw a rash. Her dog is being treated with anaplasmosis. She returned from her trips and around 7/15/2017 she begun to have fevers up to 101-102F, as well as chills and occasional night sweats. She has also had fatigue, generalized body aches, and neck pain/stiffness. She has had nausea with occasional emesis. She denies cough, diarrhea, blood in the stool, or dysuria. She also denies abdominal pain. She denies any sick contact.     She was evaluated by her PCP Dr. Maurice Hui who checked labs and she was noted to have a cholestatic elevation in her liver enzymes. The concern for tick borne illness and numerous studies were sent, all of which have been negative including Lyme, Anaplasmosis, Babesia, HIV, Hep A/B/C, peripheral smear was negative for parasites. She was empirically treated with doxycycline however she had noticed little improvement.     She has been taking tylenol BID as well as ibuprofen 400 mg/q4-6h for her fevers. She denies any other new medications or supplements. She is a social drinker.       Medical hx Surgical hx   No past medical history on file.   Left shoulder surgery.      Medications  Current Facility-Administered Medications   Medication     ibuprofen (ADVIL/MOTRIN) tablet 400 mg     naloxone (NARCAN) injection 0.1-0.4 mg     0.9% sodium chloride infusion     sodium chloride (PF) 0.9% PF flush 3 mL     sodium chloride (PF) 0.9%  "PF flush 3 mL     lidocaine 1 % 1 mL     lidocaine (LMX4) kit     sodium chloride (PF) 0.9% PF flush 3 mL     sodium chloride (PF) 0.9% PF flush 3 mL     doxycycline (VIBRAMYCIN) capsule 100 mg     melatonin tablet 1 mg     ondansetron (ZOFRAN) injection 4 mg     sodium chloride (PF) 0.9% PF flush 60 mL       Allergies  Allergies   Allergen Reactions     Codeine Nausea and Vomiting       Family hx Social hx   No family history on file.   Social History   Substance Use Topics     Smoking status: Not on file     Smokeless tobacco: Not on file     Alcohol use Not on file          Review of systems  A 10-point review of systems was negative.      Examination  /48 (BP Location: Left arm)  Pulse 78  Temp 100  F (37.8  C) (Oral)  Resp 20  Ht 1.8 m (5' 10.87\")  Wt 68.6 kg (151 lb 3.2 oz)  SpO2 96%  BMI 21.17 kg/m2    Intake/Output Summary (Last 24 hours) at 07/26/17 1100  Last data filed at 07/26/17 1100   Gross per 24 hour   Intake           3227.5 ml   Output              526 ml   Net           2701.5 ml       Gen- well, mild distress, A+Ox3  Eye- EOMI, scleral icterus   ENT- MMM  Lym- no palpable LAD  CVS- RRR  RS- CTA  Abd-ND/NT, active BS  Extr- no TABATHA  Neuro- no asterixis  Skin- no rash  Psych- normal mood    Laboratory  Lab Results   Component Value Date     07/25/2017    POTASSIUM 3.5 07/25/2017    CHLORIDE 104 07/25/2017    CO2 26 07/25/2017    BUN 9 07/25/2017    CR 0.50 07/25/2017       Lab Results   Component Value Date    BILITOTAL 3.1 07/26/2017     07/26/2017     07/26/2017    ALKPHOS 534 07/26/2017       Lab Results   Component Value Date    ALBUMIN 2.0 07/26/2017    PROTTOTAL 5.0 07/26/2017        Lab Results   Component Value Date    WBC 5.5 07/25/2017    HGB 11.5 07/25/2017    MCV 88 07/25/2017     07/25/2017       Lab Results   Component Value Date    INR 0.92 02/26/2010       Radiology  MRCP  1.  Cholelithiasis and moderate amount of pericholecystic " inflammatory  changes and fluid. While no obstructing stone is identified,  correlation for symptoms of right upper quadrant pain and potentially  right upper quadrant ultrasound would be beneficial.  2. Mild hepatomegaly with elongated left lobe of the liver extending  up to the left upper quadrant. Scattered liver cysts. No solid focal  liver lesions.  3. 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 Recommend follow-up according to criteria described below in  the present report.    HIDA scan  1. No evidence of acute cholecystitis.  2. Poor washout of radiotracer from the liver, consistent with  hepatocellular dysfunction.    Assessment  72yoF previously healthy, no significant PSH, presented with intermittent high grade fevers, body aches, headache, and noted to have cholestatic pattern in liver enzyme elevation. Her only risk factor is recent trips to [place] and [place]. She may have had tick exposure but no rash. With fevers, and acute onset in symptoms, believe acute infection is still most likely, though workup thus far is very extensive and unrevealing. We did request a MRCP which showed no bile duct dilation. She did have stones in her GB (previous US showed no stones/sludge), with pericholecystic edema, raising the possibility of cholecystitis. It should be noted that Tameka did not have any abdominal pain or tenderness. It's also unusual for cholecystitis to cause LFT elevation like this unless if GB is grossly inflamed. It's entirely possible that she has a systemic infectious process that's leading to cholestasis and non specific elevations in her LFTs.     Recommendations  -  Check HSV PCR   -  US with dopplers  -  BETH, F-actin  -  Defer further infectious workup to ID  -  OK to have tylenol totaling 2-3 grams/daily  -  Daily labs     Discussed with primary team.     Discussed with Dr Hollins, Hepatology staff.     Clifford Curtis MD  GI Fellow

## 2017-07-26 NOTE — PLAN OF CARE
Problem: Goal Outcome Summary  Goal: Goal Outcome Summary  Outcome: No Change  Tmax 100.4.  OVSS. C/o headache and body aches this morning- improved w/ ibuprofen and heat on neck. Emesis this morning- nausea resolved with IV zofran.  No rigors today. ID and GI consulted. Doxy dc'gayle, cipro added. 1L NS bolus infused. HIDA scan completed. Pt's  and family at bedside and supportive.  Continue POC.

## 2017-07-26 NOTE — PROGRESS NOTES
[facility], [place]    Internal Medicine Progress Note - Maroon Service    Main Plans for Today   1) initiate IV ciprofloxacin   2) HIDA scan     Assessment & Plan   Tameka Hebert is an otherwise healthy 72 year old female admitted on 7/25/2017 with a 2 week onset of cyclical fevers, chills, and night sweats with associated nausea and vomiting and recent travel to [place] and [place] showing no clinical improvement on doxycycline.     #fevers with nausea and vomiting: Ddx infectious vs. Rheumatologic vs. Malignancy. Infectious etiology most likely considering her elevated CRP and procalcitonin levels (69 and 0.64). So far no positive blood cultures and continued elevations in liver enzymes and increasing bilirubin. Per ID, unlikely rickettsial infection since she has not had a significant response from the doxycycline. MRCP reading reports mild hepatomegaly with cholelithiasis and pericholecystic inflammation without stone obstruction. In combination with her elevated alk phos showing a more cholestatic presentation, could possibly be presenting with an abnormal cholecystitis presentation. Given that doxycycline does not have as good of penetration into the biliary system, ID recommending ciprofloxacin and discontinue the doxycycline. Cyclic fevers and rigors could be explained by possible gram negative infection such as salmonella leading to irritation of the gall bladder. GI on board as well. Rheumatologic etiology less likely with normal ESR. Still awaiting BETH and Rf. Malignancy very unlikely given acute onset of symptoms and lack of weight loss but if infectious workup does not yield clinical improvement, can consider UPEP/SPEP.   1) monitor CBC and blood cultures  2) IV fluid bolus   3) d/c doxy and start IV ciprofloxacin   4) HIDA scan for further identification of biliary system     Diet: Room Service  Regular Diet Adult  Fluids: NS  DVT Prophylaxis: ambulate  Code Status:  full code     Disposition Plan   Expected discharge: Pending resolution of fevers and appropriate PO intake without N and V, likely able to be discharged within 2-3 days.       Entered: Romero Lopez 07/26/2017, 1:09 PM   Information in the above section will display in the discharge planner report.      The patient's care was discussed with Frida Mullins and [place].    Romero Lopez  [facility]  Maroon: 3  Pager: [phone]  Please see sticky note for cross cover information    Interval History   No acute events overnight. Patient had some emesis overnight (nonbilious, nonbloody) and one episode of intense shaking chills. Had another bout of emesis this morning when trying to take the doxycycline. Complaining of a dry mouth, feeling like cotton.     Physical Exam   Vital Signs: Temp: 100.4  F (38  C) Temp src: Axillary BP: 109/58 Pulse: 77   Resp: 18 SpO2: 94 % O2 Device: None (Room air)    Weight: 151 lbs 3.2 oz  General Appearance: Alert and oriented, conversant, lying in bed with blankets over her to keep her warm.  Respiratory: CTAB, appropriate airflow in and out of lungs   Cardiovascular: normal S1 and S2, no r/m/g  GI: +BSx4, some RUQ tenderness to deep palpation, otherwise soft and nontender   Skin: no new rashes, bruises, or skin lesions appreciated  Extremities: no LE edema        Data   Medications     NaCl 125 mL/hr at 07/26/17 0730       ciprofloxacin  400 mg Intravenous Q12H     sodium chloride 0.9%  1,000 mL Intravenous Once     sodium chloride (PF)  3 mL Intracatheter Q8H     sodium chloride (PF)  3 mL Intracatheter Q8H     sodium chloride (PF)  60 mL Intravenous Q8H     Data     Recent Labs  Lab 07/26/17  0419 07/25/17  1324   WBC  --  5.5   HGB  --  11.5*   MCV  --  88   PLT  --  244   NA  --  138   POTASSIUM  --  3.5   CHLORIDE  --  104   CO2  --  26   BUN  --  9   CR  --  0.50*   ANIONGAP  --  8   ROSANA  --  8.3*   GLC  --  96   ALBUMIN 2.0* 2.3*   PROTTOTAL 5.0* 5.7*   BILITOTAL  3.1* 2.8*   ALKPHOS 534* 594*   * 350*   * 132*   LIPASE  --  134     Recent Results (from the past 24 hour(s))   XR Chest Port 1 View    Narrative    XR CHEST PORT 1 VW, 7/25/2017 5:14 PM.    Comparison: 7/25/2017.    History: fever.    Findings:   Cardiomediastinal silhouette and pulmonary vasculature within normal  limits. Clear lungs and costophrenic angles. No pleural effusion or  pneumothorax. No soft tissue abnormalities. No concerning bony  lesions.       Impression    Impression:   No acute findings in the chest.    I have personally reviewed the examination and initial interpretation  and I agree with the findings.    SAVAGE KOVACS MD   MR Abdomen MRCP w/o & w Contrast    Narrative    MRCP Without and With Contrast    CLINICAL HISTORY: fever of unknown origin and elevated AP, AST, and  ALT    DATE: 7/25/2017 6:51 PM    TECHNIQUE:     Images were acquired with and without intravenous gadolinium contrast  through the upper abdomen. The following MR images were acquired  without intravenous contrast: TrueFISP, multiplanar T2-weighted, axial  T1 in/out of phase, T2-weighted MRCP images, axial diffusion-weighted  and axial apparent diffusion coefficient. T1-weighted images were  obtained before contrast at the multiple time points following  contrast injection. 3-D reformatted images were generated by the  technologist. Contrast dose: 7.5mL Gadavist    Comparison study: No similar study.    FINDINGS:    Biliary Tree: No intra or extrahepatic biliary dilatation.  Variant  low medial insertion of the cystic duct (series 101 image 6).    Pancreas: Preserved T1 increased signal in the precontrast images. 4  mm cystic focus in the pancreatic head, series 15 image 9; there are  two 3 mm cystic foci in the pancreatic tail, series 4 image 13 and 14.  Pancreatic duct with normal diameter. Mildly dilated side branches in  the head and uncinate process.    Liver: Enlarged liver measuring 18.2 cm in length.  The left lobe of  the liver is elongated extending up to the left upper quadrant  abutting the superior pole of the spleen. Scattered liver cysts with  bright T2 signal and no contrast enhancement the largest one measuring  1.1 cm in the left lobe of the liver, segment 2. No solid focal liver  lesions. No hepatic steatosis or iron deposition.    Gallbladder: While the gallbladder is nondistended, there are several  gallstones. Additionally, there is mucosal hyperemia and there is is  moderate pericholecystic fluid. Variant low medial insertion of the  cystic duct (series 101 image 6).    Spleen: Not enlarged. No focal lesions.    Kidneys: Rotational anomaly of the right kidney. 1.1 cm left kidney  cyst in the interpolar region. No hydronephrosis.    Adrenal glands: Unremarkable.    Bowel: No dilated loops of bowel.    Lymph nodes: Subcentimeter retroperitoneal lymph nodes, not enlarged  by size criteria.    Blood vessels: No abdominal aortic aneurysm. Moderate periportal  edema.    Lung bases: Trace right pleural effusion. No left pleural effusion.  The lateral lower lobes dependent atelectasis.    Bones and soft tissues: No suspicious lesions in the bones. Mild  degenerative changes of the spine. Mild lumbar curvature with  convexity to the right.    Mesentery and abdominal wall: Within normal limits.    Ascites: No ascites.      Impression    IMPRESSION:   1.  Cholelithiasis and moderate amount of pericholecystic inflammatory  changes and fluid. While no obstructing stone is identified,  correlation for symptoms of right upper quadrant pain and potentially  right upper quadrant ultrasound would be beneficial.  2. Mild hepatomegaly with elongated left lobe of the liver extending  up to the left upper quadrant. Scattered liver cysts. No solid focal  liver lesions.  3. 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 Recommend follow-up according to criteria described below in  the present  report.    [facility] recommendations for asymptomatic pancreatic  cysts, modified from international consensus guidelines*   Size of largest cyst:   Less than 1 cm: Follow-up imaging in 6 months to 1 year, then lengthen  interval to 2-3 years if no change.  1-2 cm: Follow-up imaging at 6 months, then yearly for 2 years, then  lengthen interval if no change.   The optimal initial study or first follow-up exam is MRI/MRCP  performed with intravenous gadolinium contrast to allow full cyst  characterization. Once characterized, contrast is optional on  follow-up MRIs. If MRI is contraindicated, CT with contrast is  recommended.   GI consultation for possible endoscopic ultrasound is recommended for  cysts with the following features: Size > 2 cm, >20% growth on  followup, wall thickening or enhancement, mural nodule, duct > 5 mm,  or abrupt change in duct caliber with distal atrophy. GI or surgical  consultation is also recommended for symptomatic cysts.   *Reference: International Consensus Guidelines for Management of IPMN  and MCN of the pancreas. Pancreatology: 12:(2012); 183-197.    I have personally reviewed the examination and initial interpretation  and I agree with the findings.    BERYL HERNANDEZ MD

## 2017-07-26 NOTE — PLAN OF CARE
Problem: Infection, Risk/Actual (Adult)  Goal: Identify Related Risk Factors and Signs and Symptoms  Related risk factors and signs and symptoms are identified upon initiation of Human Response Clinical Practice Guideline (CPG)   Outcome: No Change  S: Pt had severe episode of rigors with no temp spike. A/O x 4. Pt stated that that was what she was going through at home q 4 hours for days before she came to the hospital. Warm blankets applied with good result. Ua/UC done, Old PIV infiltrated and new one inserted. On Doxycycline. Liver enzymes continue to be elevated, Total bili 3.1,  and AST was 145. Pt got Melatonin 1 mg x 2 for sleep. MIVF NS, 0.9 mg /L at 125 ML / hour infusing. Ate 1 popssicle during the night. Will be having GI and ID consult today. Multiple labs done and results are pending. Continue to give support to pt and continue with plan of care.

## 2017-07-26 NOTE — PROGRESS NOTES
"CLINICAL NUTRITION SERVICES - ASSESSMENT NOTE     Nutrition Prescription    RECOMMENDATIONS FOR MDs/PROVIDERS TO ORDER:  Recommend monitoring electrolytes (K+/Mg++/Phos) and ordering aggressive electrolyte replacement. Pt likely at refeeding risk with prolonged poor oral intake.    Recommend a multivitamin with minerals daily in the setting of ongoing poor oral intake to cover RDAs for micronutrients likely not being consumed.    Malnutrition Status:    Criteria not met, but at risk.    Recommendations already ordered by Registered Dietitian (RD):  PRN supplement/snack order with meals.    Future/Additional Recommendations:  Diet per providers pending GI status. Target nausea and encourage intake of >50% of small, frequent meals QID in the setting of nausea/deminished appetite. If eating less than this, start on calorie counts vs consider nutrition support with a FT/TFs.         REASON FOR ASSESSMENT  Tameka Hebert is a/an 72 year old female assessed by the dietitian for Nutrition Fernando < 3 consult    NUTRITION HISTORY  Tameka reports eating poorly for the past several weeks d/t fevers, nausea, and generally feeling unwell. She does not follow a specially diet at home. Typically is not a big eater, but eats at least three times per day when feeling well. She eats cereal, eggs, and coffee for breakfast, a sandwich, yogurt, and/or string cheese for lunch, and salmon, salad, or meat (chicken, bison twice/week) for dinner.    CURRENT NUTRITION ORDERS  Diet: Regular; Room Service Appropriate  Intake/Tolerance: No intakes documented. Pt eating a popsicle and fluids mostly since admit.    LABS  +5 Urinary Ketones  K+ 3.5, no Mg++/Phos ordered    MEDICATIONS  Medications reviewed    ANTHROPOMETRICS  Height: 180 cm (5' 10.866\")  Most Recent Weight: 68.6 kg (151 lb 3.2 oz)    IBW: 70.2 kg  BMI: Normal BMI  Weight History: Fluid gain since admit. Pt reports stable UBW of 145 lbs.  Wt Readings from Last 20 Encounters: "   07/26/17 68.6 kg (151 lb 3.2 oz)       Dosing Weight: 66 kg (direst wt this admission of 65.7 kg taken 7/25/17)    ASSESSED NUTRITION NEEDS  Estimated Energy Needs: 1650 - 1980 kcals/day (25 - 30 kcals/kg)  Justification: Maintenance  Estimated Protein Needs: 66 - 80 grams protein/day (1 - 1.2 grams of pro/kg)  Justification: Hypercatabolism with acute illness  Estimated Fluid Needs: 1 mL/kcal   Justification: Maintenance    PHYSICAL FINDINGS  See malnutrition section below.    MALNUTRITION  % Intake: < 75% for > 7 days (non-severe)  % Weight Loss: None noted  Subcutaneous Fat Loss: None observed  Muscle Loss: None observed  Fluid Accumulation/Edema: None noted  Malnutrition Diagnosis: Patient does not meet two of the above criteria necessary for diagnosing malnutrition but is at risk for malnutrition    NUTRITION DIAGNOSIS  Inadequate oral intake related to decreased appetite d/t nausea/fevers as evidenced by suspected intake of <75% of nutrition needs for >7 days per pt reports and +5 kentones upon admit (may indicate prolonged poor nutrition/intake).      INTERVENTIONS  Implementation  Nutrition Education  - Provided education on tips for coping with nausea and vomiting and nutrition supplements handout. Pt receptive of information provided.    Medical food supplement therapy     Goals  Patient to consume % of nutritionally adequate meal trays QID, or the equivalent with supplements/snacks.     Monitoring/Evaluation  Progress toward goals will be monitored and evaluated per protocol.    Juan Lanier RD, DUANE  5C/BMT Dietitian  Pager: [phone]

## 2017-07-27 ENCOUNTER — APPOINTMENT (OUTPATIENT)
Dept: ULTRASOUND IMAGING | Facility: CLINIC | Age: 72
DRG: 372 | End: 2017-07-27
Attending: INTERNAL MEDICINE
Payer: MEDICARE

## 2017-07-27 LAB
ALBUMIN SERPL-MCNC: 1.8 G/DL (ref 3.4–5)
ALP SERPL-CCNC: 537 U/L (ref 40–150)
ALT SERPL W P-5'-P-CCNC: 262 U/L (ref 0–50)
ANION GAP SERPL CALCULATED.3IONS-SCNC: 10 MMOL/L (ref 3–14)
AST SERPL W P-5'-P-CCNC: 116 U/L (ref 0–45)
BILIRUB DIRECT SERPL-MCNC: 3 MG/DL (ref 0–0.2)
BILIRUB SERPL-MCNC: 3.6 MG/DL (ref 0.2–1.3)
BUN SERPL-MCNC: 12 MG/DL (ref 7–30)
C BURNET PH1 IGG SER QL IF: NORMAL
C BURNET PH2 IGG SER QL IF: NORMAL
CALCIUM SERPL-MCNC: 7.7 MG/DL (ref 8.5–10.1)
CHLORIDE SERPL-SCNC: 104 MMOL/L (ref 94–109)
CO2 SERPL-SCNC: 22 MMOL/L (ref 20–32)
CREAT SERPL-MCNC: 0.51 MG/DL (ref 0.52–1.04)
CRP SERPL-MCNC: 60 MG/L (ref 0–8)
ERYTHROCYTE [DISTWIDTH] IN BLOOD BY AUTOMATED COUNT: 15 % (ref 10–15)
GFR SERPL CREATININE-BSD FRML MDRD: ABNORMAL ML/MIN/1.7M2
GLUCOSE SERPL-MCNC: 102 MG/DL (ref 70–99)
HCT VFR BLD AUTO: 29.4 % (ref 35–47)
HGB BLD-MCNC: 10 G/DL (ref 11.7–15.7)
INR PPP: 1.12 (ref 0.86–1.14)
MCH RBC QN AUTO: 29.2 PG (ref 26.5–33)
MCHC RBC AUTO-ENTMCNC: 34 G/DL (ref 31.5–36.5)
MCV RBC AUTO: 86 FL (ref 78–100)
NUCLEAR IGG TITR SER IF: ABNORMAL {TITER}
PLATELET # BLD AUTO: 331 10E9/L (ref 150–450)
POTASSIUM SERPL-SCNC: 3.7 MMOL/L (ref 3.4–5.3)
PROCALCITONIN SERPL-MCNC: 0.56 NG/ML
PROT SERPL-MCNC: 5 G/DL (ref 6.8–8.8)
R RICKETTSI IGG TITR SER IF: NORMAL {TITER}
R RICKETTSI IGM TITR SER IF: NORMAL {TITER}
RBC # BLD AUTO: 3.43 10E12/L (ref 3.8–5.2)
SODIUM SERPL-SCNC: 137 MMOL/L (ref 133–144)
WBC # BLD AUTO: 6.4 10E9/L (ref 4–11)

## 2017-07-27 PROCEDURE — 84145 PROCALCITONIN (PCT): CPT | Performed by: STUDENT IN AN ORGANIZED HEALTH CARE EDUCATION/TRAINING PROGRAM

## 2017-07-27 PROCEDURE — 36415 COLL VENOUS BLD VENIPUNCTURE: CPT | Performed by: STUDENT IN AN ORGANIZED HEALTH CARE EDUCATION/TRAINING PROGRAM

## 2017-07-27 PROCEDURE — 86140 C-REACTIVE PROTEIN: CPT | Performed by: STUDENT IN AN ORGANIZED HEALTH CARE EDUCATION/TRAINING PROGRAM

## 2017-07-27 PROCEDURE — 25000128 H RX IP 250 OP 636: Performed by: INTERNAL MEDICINE

## 2017-07-27 PROCEDURE — 82248 BILIRUBIN DIRECT: CPT | Performed by: STUDENT IN AN ORGANIZED HEALTH CARE EDUCATION/TRAINING PROGRAM

## 2017-07-27 PROCEDURE — 99233 SBSQ HOSP IP/OBS HIGH 50: CPT | Mod: GC | Performed by: INTERNAL MEDICINE

## 2017-07-27 PROCEDURE — 20000002 ZZH R&B BMT INTERMEDIATE

## 2017-07-27 PROCEDURE — A9270 NON-COVERED ITEM OR SERVICE: HCPCS | Mod: GY | Performed by: STUDENT IN AN ORGANIZED HEALTH CARE EDUCATION/TRAINING PROGRAM

## 2017-07-27 PROCEDURE — 25000132 ZZH RX MED GY IP 250 OP 250 PS 637: Mod: GY | Performed by: STUDENT IN AN ORGANIZED HEALTH CARE EDUCATION/TRAINING PROGRAM

## 2017-07-27 PROCEDURE — 87496 CYTOMEG DNA AMP PROBE: CPT | Performed by: INTERNAL MEDICINE

## 2017-07-27 PROCEDURE — 85027 COMPLETE CBC AUTOMATED: CPT | Performed by: STUDENT IN AN ORGANIZED HEALTH CARE EDUCATION/TRAINING PROGRAM

## 2017-07-27 PROCEDURE — 87529 HSV DNA AMP PROBE: CPT | Performed by: STUDENT IN AN ORGANIZED HEALTH CARE EDUCATION/TRAINING PROGRAM

## 2017-07-27 PROCEDURE — 76705 ECHO EXAM OF ABDOMEN: CPT

## 2017-07-27 PROCEDURE — 25000128 H RX IP 250 OP 636: Performed by: STUDENT IN AN ORGANIZED HEALTH CARE EDUCATION/TRAINING PROGRAM

## 2017-07-27 PROCEDURE — 40000556 ZZH STATISTIC PERIPHERAL IV START W US GUIDANCE

## 2017-07-27 PROCEDURE — 87798 DETECT AGENT NOS DNA AMP: CPT | Performed by: STUDENT IN AN ORGANIZED HEALTH CARE EDUCATION/TRAINING PROGRAM

## 2017-07-27 PROCEDURE — 85610 PROTHROMBIN TIME: CPT | Performed by: HOSPITALIST

## 2017-07-27 PROCEDURE — 83516 IMMUNOASSAY NONANTIBODY: CPT | Performed by: STUDENT IN AN ORGANIZED HEALTH CARE EDUCATION/TRAINING PROGRAM

## 2017-07-27 PROCEDURE — 80053 COMPREHEN METABOLIC PANEL: CPT | Performed by: STUDENT IN AN ORGANIZED HEALTH CARE EDUCATION/TRAINING PROGRAM

## 2017-07-27 PROCEDURE — 36415 COLL VENOUS BLD VENIPUNCTURE: CPT | Performed by: INTERNAL MEDICINE

## 2017-07-27 RX ORDER — HYDROXYZINE HYDROCHLORIDE 25 MG/1
50 TABLET, FILM COATED ORAL ONCE
Status: COMPLETED | OUTPATIENT
Start: 2017-07-27 | End: 2017-07-27

## 2017-07-27 RX ORDER — IBUPROFEN 400 MG/1
400 TABLET, FILM COATED ORAL EVERY 6 HOURS PRN
Status: DISCONTINUED | OUTPATIENT
Start: 2017-07-27 | End: 2017-07-28

## 2017-07-27 RX ADMIN — IBUPROFEN 400 MG: 400 TABLET ORAL at 08:50

## 2017-07-27 RX ADMIN — CIPROFLOXACIN 400 MG: 2 INJECTION, SOLUTION INTRAVENOUS at 23:12

## 2017-07-27 RX ADMIN — IBUPROFEN 400 MG: 400 TABLET ORAL at 18:56

## 2017-07-27 RX ADMIN — HYDROXYZINE HYDROCHLORIDE 50 MG: 25 TABLET ORAL at 21:24

## 2017-07-27 RX ADMIN — SODIUM CHLORIDE: 9 INJECTION, SOLUTION INTRAVENOUS at 21:24

## 2017-07-27 RX ADMIN — CIPROFLOXACIN 400 MG: 2 INJECTION, SOLUTION INTRAVENOUS at 12:51

## 2017-07-27 ASSESSMENT — PAIN DESCRIPTION - DESCRIPTORS: DESCRIPTORS: ACHING

## 2017-07-27 NOTE — PROGRESS NOTES
[facility] Internal Medicine Progress Note - Maroon 3    Tameka Hebert MRN# [id number]   Age: 72 year old YOB: 1945           Assessment and Plan:   Assessment:  Tameka Hebert is a 72 year old with no significant past medical history who presents with fevers, chills, and elevated liver enzymes.     Plan:  Today:  - RUQ US today negative for acute cholecystitis but thickened gallbladder wall    - Continue IV ciprofloxacin    - Ibuprofen 400mg Q6H prn fever >101; some concern for drug induced fevers; avoid NSAIDs if possible   - ID favors subacute cholangitis; GI also favors infectious cause  - Also pending autoimmune workup for autoimmune hepatitis      #. Fever of unknown origin   #. Pericystic inflammatory changes of the gallbladder   Still experiencing chills and myalgias. Hemodynamically stable; does not meet SIRS or sepsis criteria currently. Workup at OSH negative for: lyme, anaplasma, ehrlichia, HIV, hepatitis panel, babesia, mononucleosis, [place], EBV/CMV. After MRI abdomen showing pericystic inflammation and little response to doxycycline, vector borne diseases are less likely and food borne illnesses like salmonella or a subacute cholangitis is more likely per ID. GI also on board and favor an infectious cause; do not think presentation/labs/imaging is consistent with acute cholecystitis. Rheumatologic causes like autoimmune hepatitis are still possible and labs pending.     - BETH, RF pending  - CRP elevated but ESR wnl , CK wnl  - CMV and EBV negative   - No anaplasma, babesia, or ehrlichia found on parasite smear   - BCx negative to date   -   - Quant gold pending   - UA not suspicious for UTI   - IVF with  cc/hr   - MRCP, HIDA scan, RUQ US all negative for acute cholecystitis or other gallbladder pathology   - Continue ciprofloxacin per ID   - D/C doxycycline on 07/26/17   - Trend CRP in am (60.0 on 07/27/17)    FEN: IVF w/  cc/hr, full diet   PPX: No PPI  "indicated at this time, mechanical prophylaxis   CODE: FULL      Patient was discussed with staff attending, Dr. Cuello, who agrees with the above assessment & plan    Cee Partida MD/MPH  Internal Medicine/Dermatology   PGY-1  Pager: [phone]        Interval History:   Nursing notes reviewed and noted. Patient feet better this morning with no fevers overnight. However, later in the morning had an episode of chills. Still having nausea and myalgias. No dyspnea. No new rashes.     4 point ROS including Respiratory, CV, GI and , other than that noted in the HPI,  is negative          Medications:     Current Facility-Administered Medications   Medication     ibuprofen (ADVIL/MOTRIN) tablet 400 mg     ciprofloxacin (CIPRO) infusion 400 mg     naloxone (NARCAN) injection 0.1-0.4 mg     0.9% sodium chloride infusion     sodium chloride (PF) 0.9% PF flush 3 mL     sodium chloride (PF) 0.9% PF flush 3 mL     lidocaine 1 % 1 mL     lidocaine (LMX4) kit     sodium chloride (PF) 0.9% PF flush 3 mL     sodium chloride (PF) 0.9% PF flush 3 mL     melatonin tablet 1 mg     ondansetron (ZOFRAN) injection 4 mg             Physical Exam:   Vitals were reviewed  Blood pressure 114/69, pulse 83, temperature 97.8  F (36.6  C), temperature source Oral, resp. rate 16, height 1.8 m (5' 10.87\"), weight 71.3 kg (157 lb 3 oz), SpO2 91 %.    Intake/Output Summary (Last 24 hours) at 07/26/17 1519  Last data filed at 07/26/17 1125   Gross per 24 hour   Intake           2727.5 ml   Output              626 ml   Net           2101.5 ml     Vitals:    07/25/17 0916 07/26/17 0854 07/27/17 0843   Weight: 65.7 kg (144 lb 13.5 oz) 68.6 kg (151 lb 3.2 oz) 71.3 kg (157 lb 3 oz)       Physical Exam:  General: Alert, not in respiratory or painful distress,   HEENT: anicteric sclera, PERRL, EOMI, mucous membranes dry, OP unobstructed, w/o erythema/discharge; no cervical LAD, FROM  CV: RRR, nl S1/S2, no S3/S4 appreciated, no m/r/g; intact & " symmetric 3+ pulses (radial, DP)  Lungs: CTAB, no wheezing/crackles, no cough  Abd: Obese/full/flat, soft/tense, moves with respiration, BS+/-, not tender, not distended, no palpable organomegaly, no masses   Ext: WWP, no BLE edema  Skin: no rashes, cyanosis, or jaundice  Neuro: AOx3, CN II-XII intact and symmetric, No focal neurologic deficits         Data:   ROUTINE LABS (Last four results)  CMP    Recent Labs  Lab 07/27/17  0341 07/26/17  0419 07/25/17  1324    135 138   POTASSIUM 3.7 3.7 3.5   CHLORIDE 104 104 104   CO2 22 20 26   ANIONGAP 10 11 8   * 81 96   BUN 12 11 9   CR 0.51* 0.53 0.50*   GFRESTIMATED >90Non  GFR Calc >90Non  GFR Calc >90   GFRESTBLACK >90African American GFR Calc >90African American GFR Calc >90   ROSANA 7.7* 7.9* 8.3*   PROTTOTAL 5.0* 5.0* 5.7*   ALBUMIN 1.8* 2.0* 2.3*   BILITOTAL 3.6* 3.1* 2.8*   ALKPHOS 537* 534* 594*   * 145* 132*   * 308* 350*     CBC    Recent Labs  Lab 07/27/17  0341 07/25/17  1324   WBC 6.4 5.5   RBC 3.43* 3.94   HGB 10.0* 11.5*   HCT 29.4* 34.5*   MCV 86 88   MCH 29.2 29.2   MCHC 34.0 33.3   RDW 15.0 14.5    244     INR    Recent Labs  Lab 07/27/17  0341   INR 1.12     Arterial Blood GasNo lab results found in last 7 days.  I&O's: I/O last 3 completed shifts:  In: 3355 [P.O.:580; I.V.:2775]  Out: 800 [Urine:800]    #. MRI Abdomen 07/25/17   IMPRESSION:   1.  Cholelithiasis and moderate amount of pericholecystic inflammatory  changes and fluid. While no obstructing stone is identified,  correlation for symptoms of right upper quadrant pain and potentially  right upper quadrant ultrasound would be beneficial.  2. Mild hepatomegaly with elongated left lobe of the liver extending  up to the left upper quadrant. Scattered liver cysts. No solid focal  liver lesions.  3. 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 Recommend follow-up according to criteria described below in  the  present report.    #. US Abdomen 07/27/17   Impression:   1.  Circumferential gallbladder wall thickening measuring up to 13 mm,  with cholelithiasis, overall similar to seen on MRI of 7/25/2017.  Nuclear medicine hepatobiliary scan performed yesterday demonstrated  no evidence of acute cholecystitis.  2.  Small right pleural effusion.  3.  Normal hepatic Doppler study.    #. HIDA Scan 07/26/17   Impression:  1. No evidence of acute cholecystitis.  2. Poor washout of radiotracer from the liver, consistent with  hepatocellular dysfunction.

## 2017-07-27 NOTE — PROGRESS NOTES
[facility], [place]     Internal Medicine Progress Note - [facility] Service        Main Plans for Today      1) continue ciprofloxacin  2) touch base with ID to determine if metronidazole should be added      Assessment & Plan     Tameka Hebert is an otherwise healthy 72 year old female admitted on 7/25/2017 with a 2 week onset of cyclical fevers, chills, and night sweats with associated nausea and vomiting and recent travel to [place] and [place] showing mild improvement on ciprofloxacin.     #fevers with nausea and vomiting: Ddx infectious vs. Rheumatologic vs. Malignancy. Infectious etiology most likely considering her elevated CRP and procalcitonin levels (69 and 0.64). Working hypothesis is possible infection from salmonella typhi as it can colonize in the gall bladder and cause recurrent fevers. Not presenting with typical jessenia spots on abdomen. Per ID, unlikely rickettsial infection since her previously started course of doxycycline should have shown marked clinical improvement. Doxy d/c'ed 7/26.  Patient has now been on IV ciprofloxacin for almost 24 hours and showing a decrease in her procalcitonin (0.56 on 7/27) and CRP (69, now 60 on 7/27). Patient did not spike a fever for almost 24 hrs until 7/27 in the AM to 100.2 but has since then come down. Appears that the ciprofloxacin is having an effect. So far no positive blood cultures and continued elevations in liver enzymes and increasing bilirubin. Concern for a possible obstructive process causing infection unlikely given negative findings from HIDA scan and AB-US. GI on board as well though deferring to ID at the present. Rheumatologic etiology less likely with normal ESR. Still awaiting BETH and Rf. Malignancy very unlikely given acute onset of symptoms and lack of weight loss but if infectious workup does not yield clinical improvement, can consider UPEP/SPEP.   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 Recommend follow-up: Less than 1 cm: Follow-up imaging in 6 months to 1 year, then lengthen interval to 2-3 years if no change.         Diet: Room Service  Regular Diet Adult  Fluids: NS  DVT Prophylaxis: ambulate  Code Status: full code         Disposition Plan     Expected discharge: Pending resolution of fevers and appropriate PO intake without N and V, likely able to be discharged within 2-3 days.       Entered: Romero Lopez 07/26/2017, 1:09 PM     Information in the above section will display in the discharge planner report.       The patient's care was discussed with Frida Partida and [place].     Romero Lopez  [facility]  Maroon: 3  Pager: [phone]  Please see sticky note for cross cover information        Interval History      No acute events overnight. Feeling a bit nauseous but zofran is helping. Patient did not have another episode of rigors and says that she does not have any knee arthralgia. Overall she says that she is feeling better. Not complaining of abdominal pain, shortness of breath, change in stools or urinary frequency. ROS negative other than what was previously in HPI.        Physical Exam     Vital Signs: Temp: 100.4  F (38  C) Temp src: Axillary BP: 109/58 Pulse: 77   Resp: 18 SpO2: 94 % O2 Device: None (Room air)    Weight: 151 lbs 3.2 oz  General Appearance: Alert and oriented, conversant, lying in bed with blankets over her to keep her warm. More relaxed than day prior.  Respiratory: CTAB, appropriate airflow in and out of lungs   Cardiovascular: normal S1 and S2, no r/m/g  GI: +BSx4, no RUQ tenderness to deep palpation, otherwise soft and nontender   Skin: no new rashes, bruises, or skin lesions " appreciated  Extremities: some trace nonpitting edema.        Data   Medications     NaCl 75 mL/hr at 07/27/17 1035       ciprofloxacin  400 mg Intravenous Q12H     sodium chloride (PF)  3 mL Intracatheter Q8H     sodium chloride (PF)  3 mL Intracatheter Q8H     sodium chloride (PF)  60 mL Intravenous Q8H     Data     Recent Labs  Lab 07/27/17  0341 07/26/17  0419 07/25/17  1324   WBC 6.4  --  5.5   HGB 10.0*  --  11.5*   MCV 86  --  88     --  244   INR 1.12  --   --     135 138   POTASSIUM 3.7 3.7 3.5   CHLORIDE 104 104 104   CO2 22 20 26   BUN 12 11 9   CR 0.51* 0.53 0.50*   ANIONGAP 10 11 8   ROSANA 7.7* 7.9* 8.3*   * 81 96   ALBUMIN 1.8* 2.0* 2.3*   PROTTOTAL 5.0* 5.0* 5.7*   BILITOTAL 3.6* 3.1* 2.8*   ALKPHOS 537* 534* 594*   * 308* 350*   * 145* 132*   LIPASE  --   --  134     Recent Results (from the past 24 hour(s))   NM HepatOBiliary Scan    Narrative    Examination:  Nuclear medicine hepatobiliary scan      Date: 7/26/2017 4:28 PM.    Indication:   Elevated liver enzymes     Technique:    The patient received 5.30 mCi of Tc-99m Choletec intravenously. Images  were obtained out through 60 minutes. At 60 minutes morphine was  administered.    Findings:    There is prompt clearance of the radionuclide from the blood pool into  the liver. By 10 minutes there is clear visualization of the  intrahepatic ducts as well as the upper common bile duct. By 25  minutes there is visualization of the gallbladder. At 60 minutes there  is emptying from the common bile duct into the small bowel. After  morphine administration, there is more convincing radiotracer filling  within the gallbladder. On post morphine images there is further  excretion of radiotracer into the small bowel.    Enterogastric reflux was not present.    There is poor washout of radiotracer from the liver even on delayed  images.      Impression    Impression:  1. No evidence of acute cholecystitis.  2. Poor washout of  radiotracer from the liver, consistent with  hepatocellular dysfunction.    I have personally reviewed the examination and initial interpretation  and I agree with the findings.    SAVAGE KOVACS MD    [facility]    Narrative    EXAMINATION: Right upper quadrant ultrasound with Doppler, 7/27/2017  9:53 AM     COMPARISON: MR 7/25/2017    HISTORY: Elevated liver function tests, fever    TECHNIQUE: The abdomen was scanned in standard fashion with  specialized ultrasound transducer(s) using both gray-scale, color  Doppler, and spectral flow techniques.    Findings:    Liver: Liver is normal size and echogenicity. No focal liver lesion.  Cysts seen on recent MRI are not well visualized on the current exam.    Extrahepatic portal vein flow is antegrade, measuring 24 cm/sec.  Portal vein measures 14 mm.  Right portal vein flow is antegrade, measuring 21 cm/sec.  Left portal vein flow is antegrade, measuring 24 cm/sec.    Flow in the hepatic artery is towards the liver and:  107 cm/sec peak systolic  0.70 resistive index.     The splenic vein is patent and flow is towards the liver.  The left,  middle, and right hepatic veins are patent with flow towards the IVC.  The IVC is patent with flow towards the heart.   The visualized aorta  is not dilated.    Gallbladder: Circumferential heterogeneous gallbladder wall  thickening, measuring up to 1.3 cm. No discrete pericholecystic fluid.  Few mobile stones in the gallbladder, as demonstrated on MR 7/25/2017.  Negative sonographic Cortés's sign.    Bile Ducts: Both the intra- and extrahepatic biliary system are of  normal caliber.  The common bile duct measures 4 mm in diameter.    Pancreas: Visualized portions of the head and body of the pancreas are  unremarkable.     Kidneys: Right kidney is of normal echotexture, without mass or  hydronephrosis.   The craniocaudal dimension is 10.3 cm.    Fluid: Small right pleural effusion.       Impression    Impression:   1.  Circumferential gallbladder wall thickening measuring up to 13 mm,  with cholelithiasis, overall similar to seen on MRI of 7/25/2017.  Nuclear medicine hepatobiliary scan performed yesterday demonstrated  no evidence of acute cholecystitis.  2.  Small right pleural effusion.  3.  Normal hepatic Doppler study.    I have personally reviewed the examination and initial interpretation  and I agree with the findings.    DARLINE SHERMAN MD

## 2017-07-27 NOTE — PLAN OF CARE
Problem: Goal Outcome Summary  Goal: Goal Outcome Summary  Outcome: Improving  Tmax 100.2, OVSS. C/o HA this morning- improved w/ ibuprofen. Had AB US this morning.  MIVF rate decreased to 75ml/hr. IV Abx administered. States she has an appetite today for the first time in two weeks. Appears more energetic.  Continue POC.

## 2017-07-27 NOTE — PROGRESS NOTES
"GASTROENTEROLOGY PROGRESS NOTE    ASSESSMENT:  72yoF no significant PMH, presented with intermittent high grade fevers, headache, neck pain that came on after trips to [place] and [place], noted to have cholestatic elevation in LFT and negative workup for tick borne illness and acute HepA/B/C. Workup here including MRCP was negative bile duct obstruction, mild pericholecystic fluid collection, negative HIDA scan, US w doppler negative for thrombosis. Still suspect infectious cause, ID consult has seen.     RECOMMENDATIONS:  - follow up BETH/F-actin, HSV PCR  - EBV/CMV IgM negative; PCRs pending  - remain on ciprofloxacin per ID recommendations  - if LFT elevation persists, may need liver biopsy    The patient was discussed and plan agreed upon with GI staff.    Clifford Curtis MD  GI Fellow  _______________________________________________________________  S: temp 100F overnight. She actually feels better. No more emesis, and body aches better. No diarrhea.     O:  Blood pressure 104/62, pulse 74, temperature 96.9  F (36.1  C), temperature source Axillary, resp. rate 16, height 1.8 m (5' 10.87\"), weight 71.3 kg (157 lb 3 oz), SpO2 91 %.    Gen:mild distress  HEENT:ncat, scleral icterus   Abd:nd/nt  Skin:jaundice, warm, dry  Neuro:non focal, no asterixis     LABS:  BMP  Recent Labs  Lab 07/27/17  0341 07/26/17  0419 07/25/17  1324    135 138   POTASSIUM 3.7 3.7 3.5   CHLORIDE 104 104 104   ROSANA 7.7* 7.9* 8.3*   CO2 22 20 26   BUN 12 11 9   CR 0.51* 0.53 0.50*   * 81 96     CBC  Recent Labs  Lab 07/27/17  0341 07/25/17  1324   WBC 6.4 5.5   RBC 3.43* 3.94   HGB 10.0* 11.5*   HCT 29.4* 34.5*   MCV 86 88   MCH 29.2 29.2   MCHC 34.0 33.3   RDW 15.0 14.5    244     INR  Recent Labs  Lab 07/27/17  0341   INR 1.12     LFTs  Recent Labs  Lab 07/27/17  0341 07/26/17  0419 07/25/17  1324   ALKPHOS 537* 534* 594*   * 145* 132*   * 308* 350*   BILITOTAL 3.6* 3.1* 2.8*   PROTTOTAL 5.0* 5.0* 5.7* "   ALBUMIN 1.8* 2.0* 2.3*      PANC  Recent Labs  Lab 07/25/17  1324   LIPASE 134   AMYLASE 21*    ATTENDING NOTE, GASTROENTEROLOGY/HEPATOLOGY    I saw and discussed this patient with the fellow and participated in the decision making. I agree with the fellow's note. Emily Sparrow MD

## 2017-07-27 NOTE — PLAN OF CARE
Problem: Goal Outcome Summary  Goal: Goal Outcome Summary  Outcome: No Change  Afebrile, VSS, A&O x 4, denies pain, SOB, N/V/D/C. Pt wanted something to help her sleep, 50 mg atarax ordered but pt only wanted to take 25 mg, which was effective and pt slept for 4-5 hours. No replacements needed this morning. Abdominal US scheduled for this morning, pt has been NPO since MN. Pt offers no further complaints. Will continue POC.

## 2017-07-27 NOTE — PROGRESS NOTES
[facility], [place]   General Infectious Disease Progress Note     Patient:  Tameka Hebert, Date of birth 1945, Medical record number [id number]  Date of Visit:  July 25, 2017  Date of Admission: 7/25/2017  Consult Requester:aVnessa Cuello MD            Assessment and Recommendations:   ASSESSMENT:  1. Subacute ascending cholangitis / cholecystitis.  -- non-typhoidal salmonella infection could be a possible/probable unifying diagnosis, and one does not need diarrhea to have salmonellosis.  Cholangitis as well as liver infection can occur in older persons.  Biliary tract colonization is common.   -- This is obviously not acute cholecystitis with obstruction based on the clinical presentation and physical examination.   -- the fever, rigors, elevated procalcitonin and elevated CRP (which are now trending with improvement on antibiotics = bacterial process)   Viral infections don't elevated procalcitonin or CRP.  -- Viral work up negative with common serologies including Hep A,B,C, CMV IgM, and EBV IgM.  The elevated procalcitonin and CRP is not compatible with viral process.    2. Hematologic abnormalities. Initial lymphopenia and thrombocytopenia noted at OSH now resolving. Currently normocytic, normochromic anemia.  Leukopenia can be seen with Salmonella    3. Outdoor exposures unlikely related. Travel history (i.e. eating in restaurants) is likely related.      RECOMMENDATION:  1. Continue ciprofloxacin 400mg IV q12h transition to oral as nausea resolves.  2. Trend LFTs and CRP daily.   3. Blood culture with fever spikes.  4. ID would not recommend viral PCR testing for EBV and CMV as this makes no medical sense in this situation based on the duration of illness and negative CMV IgM and EBV IgM serologies.       We expect her to slowly improve.       Pending Rickettsia, tularemia, coxiella, nuclear Ab, rheumatoid factor, F-actin, HSV-1 & 2.       DISCUSSION  Ms. Hebert is a  72-year-old female with a recent history of hiking in [place] and [place] presents with fever of unknown origin. Initial infectious work-up including Anaplasmosis, Ehrlichiosis, Babesiosis, EBV, CMV, HIV, West Nile Virus, HAV, HBV, HCV, acute Parvovirus B19 infection has been negative thus far. Moreover, recent CXR was negative and MRI of abdomen was negative for abscess and/or malignancy. Blood cultures thus far have shown no growth to date. Furthermore, she received Doxycycline from 7/20-7/26 without any resolution of her symptoms. History of intermittent rigors is suggestive of possible/probable intermittent bacteremia. An elevated CRP of 60 mg/L is trending downward and mildly elevated procalcitonin 0.54 ng/mL is consistent with a bacterial process and not compatible with viral etiology.     Subacute cholecystitis most likely at this time due to elevated LFTs and hyperbilirubinemia in conjunction with 7/26 MR-abdomen findings concerning for ascending cholangitis. Physical exam is not consistent with acute cholescystitis; however, MRI findings are clearly abnormal (as are LFTs), thus targeting enteric sammi is reasonable. We recommend continuing Ceftriaxone and trending CRPs to monitor response to treatment. We also recommend deferring any additional scans since she does not appear septic or with acute cholescystitis, thus coupled with MRI and HIDA findings, unlikely to have obstruction.     ________________________________________________________________    Scribe Disclosure:   I, Juan Redd, am serving as a scribe; to document services personally performed by Colton Liu -based on data collection and the provider's statements to me.     Patient was seen and discussed with Infectious Disease attending, Dr. Liu.    Jing Botello MD  Internal Medicine Resident, PGY-1  Pager: [phone]  ________________________________________________________________    Consult Question: fever of unknown  "origin  Admission Diagnosis: fever   elevated billirubin  Fever         History of Present Illness:   Ms. Hebert is a 72-year-old female with an unremarkable PMH who was admitted to [facility] on 7/25 at the behest of her PCP who noted absolute lymphopenia on CBC. On 7/15 shortly after retuning from a hiking trip in [place] from 7/8-7/15, she experienced occipital HAs, increased fatigue, chills, and fevers (Tmax 102) that occured every 4-6 hours \"like clockwork.\" Moreover, she reported myalgias of the lower extremities bilaterally, neck, and shoulders. Of note, she visited [place] 6/9-6/23 where she removed ticks from others but denies getting bitten herself. She denies other insect bites, rash, sick contacts, and eating undercooked meats. Moreover, she denied any diarrhea, hematochezia, abdominal pain, cough, CP, SOB, jaw pain/fatigue, transient vision loss.     On 7/20, her PCP initiated her on Doxycycline for 10 days for presumed tick-borne illness. She was negative for Lyme, Anaplasma and Ehrlichia at the time. However, she was noted to have elevated LFTs (, , , Tbili 0.6) at the time. The following day, she visited the [facility] due to an episode of non-bilious, non-bloody emesis. In the ED, workup for Lyme, hep A, B, C, anaplasma, erlichia, CMV, and EBV were negative. An AB-US was performed which was unremarkable. Patient was discharged to home with recommendations to maintain hydration, continue ibuprofen for cyclical fevers, and continue doxycycline course. Because the patient felt worse, she returned to her PCP on 7/24, where she was noted to have elevated liver enzymes (, , alk phos 717, t.bili 2.5 and direct bili 2.0). Due to cyclic fevers with chills and sweats in addition to her elevated liver enzymes, she was admitted to [facility]. Of note, she denies having pets at home, living on or near a farm, taking any outpatient medications.    Since admission, the patient has " been intermittently febrile (Tmax 100.7 on 7/25) and normotensive. CXR on 7/25 was negative for infiltrates, consolidation, acute processes. 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 7/26 HIDA scan showed no evidence of acute cholecystitis and poor washout of the radiotracer from the liver consistent with hepatocellular dysfunction. RUQ US today shows circumferential gallbladder wall thickening with cholelithiasis, and no evidence of acute cholecystitis based on earlier HIDA scan.      7/26 Blood culture X1 and parasite stain negative. Initial infectious work-up including Anaplasmosis, Ehrlichiosis, Babesiosis, EBV, CMV, HIV, West Nile Virus, HAV, HBV, HCV, acute Parvovirus B19 infection has been negative thus far. The On 7/26 doxycycline was discontinued due to negative tickborne disease work-up and symptom persistence despite 7 days of therapy. On 7/26, IV ciprofloxacin was started. Tmax 100.4 yesterday, but no fevers overnight. CRP is trending down from 77 on admission to 60 today. The patient's Tbili is trending up 3.6 - 3.1 - 2.8 (direct 3.0). ALT and AST trending down; ALP stable. Further work-up pending.    This morning the patient reports neck and occipital achy pain in addition to chills and nausea. Denies any episodes of emesis. Reports ambulating yesterday and this morning. The patient reports that her BMs are normal, formed, non-bloody. She denies having pets at home, living on or near a farm, taking any outpatient medications.            Review of Systems:   CONSTITUTIONAL:  Positive for fevers, chills, and diaphoresis  EYES: negative for icterus  ENT:  negative for hearing loss, tinnitus and sore throat  RESPIRATORY:  negative for cough  with sputum and dyspnea  CARDIOVASCULAR:  negative for chest pain, dyspnea  GASTROINTESTINAL:  Positive for nausea and vomiting. negative for diarrhea and constipation  GENITOURINARY:  negative for dysuria  HEME:  No easy bruising  INTEGUMENT:  negative for rash and pruritus  NEURO:  Negative for headache           Past Medical History:   No past medical history on file.         Past Surgical History:   No past surgical history on file.         Family History:   No family history on file.         Social History:     Social History   Substance Use Topics     Smoking status: Not on file     Smokeless tobacco: Not on file     Alcohol use Not on file            Current Medications (antimicrobials listed in bold):       ciprofloxacin  400 mg Intravenous Q12H     sodium chloride (PF)  3 mL Intracatheter Q8H     sodium chloride (PF)  3 mL Intracatheter Q8H     sodium chloride (PF)  60 mL Intravenous Q8H            Allergies:     Allergies   Allergen Reactions     Codeine Nausea and Vomiting            Physical Exam:   Vitals were reviewed  Patient Vitals for the past 24 hrs:   BP Temp Temp src Pulse Resp SpO2 Weight   07/27/17 0344 113/57 98.1  F (36.7  C) Axillary 80 16 94 % -   07/26/17 2314 110/55 99.5  F (37.5  C) Axillary 100 18 92 % -   07/26/17 2000 110/60 - - - - - -   07/26/17 1956 - 96.7  F (35.9  C) Axillary 74 16 - -   07/26/17 1641 104/58 96.3  F (35.7  C) Axillary 62 18 98 % -   07/26/17 1120 109/58 100.4  F (38  C) Axillary 77 18 94 % -   07/26/17 0854 - - - - - - 68.6 kg (151 lb 3.2 oz)   07/26/17 0816 104/48 100  F (37.8  C) Oral 78 20 96 % -     Ranges for his vital signs:  Temp:  [96.3  F (35.7  C)-100.4  F (38  C)] 98.1  F (36.7  C)  Pulse:  [] 80  Resp:  [16-20] 16  BP: (104-113)/(48-60) 113/57  SpO2:  [92 %-98 %] 94 %    Physical Examination:  GENERAL:  well-developed, well-nourished, in bed in no acute distress.  HEENT:  Head is normocephalic, atraumatic   EYES:  Eyes have anicteric sclerae  without conjunctival injection or stigmata of endocarditis.    ENT:  Oropharynx is moist without exudates or ulcers. Tongue is midline  NECK:  Supple. No  Cervical lymphadenopathy  LUNGS:  Clear to auscultation bilateral.   CARDIOVASCULAR:  Regular rate and rhythm with no murmurs, gallops or rubs.  ABDOMEN:  Normal bowel sounds, soft, nontender. No appreciable hepatosplenomegaly. Negative Cortés's sign   SKIN:  No acute rashes. No stigmata of endocarditis.  NEUROLOGIC:  Grossly nonfocal. Active x4 extremities          Laboratory Data:     CRP Inflammation   Date Value Ref Range Status   07/27/2017 60.0 (H) 0.0 - 8.0 mg/L Final   07/26/2017 69.0 (H) 0.0 - 8.0 mg/L Final   07/25/2017 77.0 (H) 0.0 - 8.0 mg/L Final     Creatinine   Date Value Ref Range Status   07/27/2017 0.51 (L) 0.52 - 1.04 mg/dL Final   07/26/2017 0.53 0.52 - 1.04 mg/dL Final   07/25/2017 0.50 (L) 0.52 - 1.04 mg/dL Final   10/08/2014 0.74 0.52 - 1.04 mg/dL Final   03/05/2014 0.77 0.52 - 1.04 mg/dL Final     WBC   Date Value Ref Range Status   07/27/2017 6.4 4.0 - 11.0 10e9/L Final   07/25/2017 5.5 4.0 - 11.0 10e9/L Final   10/08/2014 4.1 4.0 - 11.0 10e9/L Final   03/05/2014 3.5 (L) 4.0 - 11.0 10e9/L Final   02/26/2010 4.9 4.0 - 11.0 10e9/L Final     Hemoglobin   Date Value Ref Range Status   07/27/2017 10.0 (L) 11.7 - 15.7 g/dL Final     MCV   Date Value Ref Range Status   07/27/2017 86 78 - 100 fl Final     Platelet Count   Date Value Ref Range Status   07/27/2017 331 150 - 450 10e9/L Final     Sed Rate   Date Value Ref Range Status   07/26/2017 27 0 - 30 mm/h Final     ALT   Date Value Ref Range Status   07/27/2017 262 (H) 0 - 50 U/L Final   07/26/2017 308 (H) 0 - 50 U/L Final   07/25/2017 350 (H) 0 - 50 U/L Final   02/26/2010 15 0 - 50 U/L Final     AST   Date Value Ref Range Status   07/27/2017 116 (H) 0 - 45 U/L Final   07/26/2017 145 (H) 0 - 45 U/L Final   07/25/2017 132 (H) 0 - 45 U/L Final     Bilirubin Total   Date Value Ref Range Status    07/27/2017 3.6 (H) 0.2 - 1.3 mg/dL Final   07/26/2017 3.1 (H) 0.2 - 1.3 mg/dL Final   07/25/2017 2.8 (H) 0.2 - 1.3 mg/dL Final     No results found for: CMVPCR, CMQNT, CMVQ, HSVSP, HSVPC, EBVDN  Lab Results   Component Value Date     07/27/2017    BUN 12 07/27/2017       Attending Physician Attestation:  I have seen and evaluated Tameka Hebert. I have discussed with the house staff team or resident(s), and I agree with the above documented findings and plan in this note. I have reviewed today's vital signs, medications, labs and imaging.  If a medical student was involved in this note, they were serving in a capacity as a scribe.  Floor time: 35 minutes  Face-to-face time: 25 minutes  Total time: 60 minutes     Colton Liu MD MPH  [facility] Distinguished   Division of Infectious Diseases & International Medicine  Department of Medicine

## 2017-07-28 ENCOUNTER — APPOINTMENT (OUTPATIENT)
Dept: GENERAL RADIOLOGY | Facility: CLINIC | Age: 72
DRG: 372 | End: 2017-07-28
Attending: INTERNAL MEDICINE
Payer: MEDICARE

## 2017-07-28 LAB
ALBUMIN SERPL-MCNC: 1.7 G/DL (ref 3.4–5)
ALP SERPL-CCNC: 518 U/L (ref 40–150)
ALT SERPL W P-5'-P-CCNC: 207 U/L (ref 0–50)
ANION GAP SERPL CALCULATED.3IONS-SCNC: 9 MMOL/L (ref 3–14)
AST SERPL W P-5'-P-CCNC: 71 U/L (ref 0–45)
BILIRUB DIRECT SERPL-MCNC: 3.5 MG/DL (ref 0–0.2)
BILIRUB SERPL-MCNC: 4.4 MG/DL (ref 0.2–1.3)
BUN SERPL-MCNC: 7 MG/DL (ref 7–30)
CALCIUM SERPL-MCNC: 7.5 MG/DL (ref 8.5–10.1)
CHLORIDE SERPL-SCNC: 106 MMOL/L (ref 94–109)
CO2 SERPL-SCNC: 23 MMOL/L (ref 20–32)
CREAT SERPL-MCNC: 0.43 MG/DL (ref 0.52–1.04)
CRP SERPL-MCNC: 54 MG/L (ref 0–8)
ERYTHROCYTE [DISTWIDTH] IN BLOOD BY AUTOMATED COUNT: 14.8 % (ref 10–15)
F TULAR IGG SER QL: 0
F TULAR IGM SER QL: 0
GFR SERPL CREATININE-BSD FRML MDRD: ABNORMAL ML/MIN/1.7M2
GGT SERPL-CCNC: 301 U/L (ref 0–40)
GLUCOSE SERPL-MCNC: 100 MG/DL (ref 70–99)
HCT VFR BLD AUTO: 30.7 % (ref 35–47)
HGB BLD-MCNC: 10.6 G/DL (ref 11.7–15.7)
HSV1 DNA SPEC QL NAA+PROBE: NEGATIVE
HSV2 DNA SPEC QL NAA+PROBE: NORMAL
MCH RBC QN AUTO: 29.3 PG (ref 26.5–33)
MCHC RBC AUTO-ENTMCNC: 34.5 G/DL (ref 31.5–36.5)
MCV RBC AUTO: 85 FL (ref 78–100)
PLATELET # BLD AUTO: 380 10E9/L (ref 150–450)
POTASSIUM SERPL-SCNC: 3.3 MMOL/L (ref 3.4–5.3)
POTASSIUM SERPL-SCNC: 3.9 MMOL/L (ref 3.4–5.3)
PROCALCITONIN SERPL-MCNC: 0.47 NG/ML
PROT SERPL-MCNC: 4.9 G/DL (ref 6.8–8.8)
RBC # BLD AUTO: 3.62 10E12/L (ref 3.8–5.2)
RHEUMATOID FACT SER NEPH-ACNC: <20 IU/ML (ref 0–20)
SMA IGG SER-ACNC: 6
SODIUM SERPL-SCNC: 138 MMOL/L (ref 133–144)
SPECIMEN SOURCE: NORMAL
WBC # BLD AUTO: 6.8 10E9/L (ref 4–11)

## 2017-07-28 PROCEDURE — 85027 COMPLETE CBC AUTOMATED: CPT | Performed by: STUDENT IN AN ORGANIZED HEALTH CARE EDUCATION/TRAINING PROGRAM

## 2017-07-28 PROCEDURE — 25000132 ZZH RX MED GY IP 250 OP 250 PS 637: Mod: GY | Performed by: INTERNAL MEDICINE

## 2017-07-28 PROCEDURE — 36415 COLL VENOUS BLD VENIPUNCTURE: CPT | Performed by: STUDENT IN AN ORGANIZED HEALTH CARE EDUCATION/TRAINING PROGRAM

## 2017-07-28 PROCEDURE — A9270 NON-COVERED ITEM OR SERVICE: HCPCS | Mod: GY | Performed by: STUDENT IN AN ORGANIZED HEALTH CARE EDUCATION/TRAINING PROGRAM

## 2017-07-28 PROCEDURE — 87040 BLOOD CULTURE FOR BACTERIA: CPT | Performed by: INTERNAL MEDICINE

## 2017-07-28 PROCEDURE — 20000002 ZZH R&B BMT INTERMEDIATE

## 2017-07-28 PROCEDURE — 25000132 ZZH RX MED GY IP 250 OP 250 PS 637: Mod: GY | Performed by: STUDENT IN AN ORGANIZED HEALTH CARE EDUCATION/TRAINING PROGRAM

## 2017-07-28 PROCEDURE — 25000125 ZZHC RX 250: Performed by: INTERNAL MEDICINE

## 2017-07-28 PROCEDURE — 99232 SBSQ HOSP IP/OBS MODERATE 35: CPT | Mod: GC | Performed by: INTERNAL MEDICINE

## 2017-07-28 PROCEDURE — 86140 C-REACTIVE PROTEIN: CPT | Performed by: STUDENT IN AN ORGANIZED HEALTH CARE EDUCATION/TRAINING PROGRAM

## 2017-07-28 PROCEDURE — 84132 ASSAY OF SERUM POTASSIUM: CPT | Performed by: INTERNAL MEDICINE

## 2017-07-28 PROCEDURE — 25000128 H RX IP 250 OP 636: Performed by: STUDENT IN AN ORGANIZED HEALTH CARE EDUCATION/TRAINING PROGRAM

## 2017-07-28 PROCEDURE — 93010 ELECTROCARDIOGRAM REPORT: CPT | Performed by: INTERNAL MEDICINE

## 2017-07-28 PROCEDURE — 80053 COMPREHEN METABOLIC PANEL: CPT | Performed by: STUDENT IN AN ORGANIZED HEALTH CARE EDUCATION/TRAINING PROGRAM

## 2017-07-28 PROCEDURE — 25000128 H RX IP 250 OP 636: Performed by: INTERNAL MEDICINE

## 2017-07-28 PROCEDURE — 36415 COLL VENOUS BLD VENIPUNCTURE: CPT | Performed by: INTERNAL MEDICINE

## 2017-07-28 PROCEDURE — 82248 BILIRUBIN DIRECT: CPT | Performed by: STUDENT IN AN ORGANIZED HEALTH CARE EDUCATION/TRAINING PROGRAM

## 2017-07-28 PROCEDURE — 93005 ELECTROCARDIOGRAM TRACING: CPT

## 2017-07-28 PROCEDURE — 84145 PROCALCITONIN (PCT): CPT | Performed by: STUDENT IN AN ORGANIZED HEALTH CARE EDUCATION/TRAINING PROGRAM

## 2017-07-28 PROCEDURE — 82977 ASSAY OF GGT: CPT | Performed by: INTERNAL MEDICINE

## 2017-07-28 PROCEDURE — A9270 NON-COVERED ITEM OR SERVICE: HCPCS | Mod: GY | Performed by: INTERNAL MEDICINE

## 2017-07-28 PROCEDURE — 71010 XR CHEST PORT 1 VW: CPT

## 2017-07-28 PROCEDURE — 86709 HEPATITIS A IGM ANTIBODY: CPT | Performed by: STUDENT IN AN ORGANIZED HEALTH CARE EDUCATION/TRAINING PROGRAM

## 2017-07-28 RX ORDER — ACETAMINOPHEN 325 MG/1
650 TABLET ORAL EVERY 8 HOURS PRN
Status: DISCONTINUED | OUTPATIENT
Start: 2017-07-28 | End: 2017-07-31 | Stop reason: HOSPADM

## 2017-07-28 RX ORDER — FUROSEMIDE 10 MG/ML
20 INJECTION INTRAMUSCULAR; INTRAVENOUS ONCE
Status: COMPLETED | OUTPATIENT
Start: 2017-07-28 | End: 2017-07-28

## 2017-07-28 RX ORDER — POTASSIUM CHLORIDE 750 MG/1
20-40 TABLET, EXTENDED RELEASE ORAL
Status: DISCONTINUED | OUTPATIENT
Start: 2017-07-28 | End: 2017-07-31 | Stop reason: HOSPADM

## 2017-07-28 RX ORDER — POTASSIUM CHLORIDE 7.45 MG/ML
10 INJECTION INTRAVENOUS
Status: DISCONTINUED | OUTPATIENT
Start: 2017-07-28 | End: 2017-07-31 | Stop reason: HOSPADM

## 2017-07-28 RX ORDER — HYDROXYZINE HYDROCHLORIDE 25 MG/1
50 TABLET, FILM COATED ORAL
Status: DISCONTINUED | OUTPATIENT
Start: 2017-07-28 | End: 2017-07-31 | Stop reason: HOSPADM

## 2017-07-28 RX ORDER — POTASSIUM CL/LIDO/0.9 % NACL 10MEQ/0.1L
10 INTRAVENOUS SOLUTION, PIGGYBACK (ML) INTRAVENOUS
Status: DISCONTINUED | OUTPATIENT
Start: 2017-07-28 | End: 2017-07-31 | Stop reason: HOSPADM

## 2017-07-28 RX ORDER — PIPERACILLIN SODIUM, TAZOBACTAM SODIUM 3; .375 G/15ML; G/15ML
3.38 INJECTION, POWDER, LYOPHILIZED, FOR SOLUTION INTRAVENOUS EVERY 6 HOURS
Status: DISCONTINUED | OUTPATIENT
Start: 2017-07-28 | End: 2017-07-31 | Stop reason: HOSPADM

## 2017-07-28 RX ORDER — POTASSIUM CHLORIDE 1.5 G/1.58G
20-40 POWDER, FOR SOLUTION ORAL
Status: DISCONTINUED | OUTPATIENT
Start: 2017-07-28 | End: 2017-07-31 | Stop reason: HOSPADM

## 2017-07-28 RX ORDER — POTASSIUM CHLORIDE 29.8 MG/ML
20 INJECTION INTRAVENOUS
Status: DISCONTINUED | OUTPATIENT
Start: 2017-07-28 | End: 2017-07-31 | Stop reason: HOSPADM

## 2017-07-28 RX ADMIN — FUROSEMIDE 20 MG: 10 INJECTION, SOLUTION INTRAVENOUS at 21:00

## 2017-07-28 RX ADMIN — IBUPROFEN 400 MG: 400 TABLET ORAL at 05:30

## 2017-07-28 RX ADMIN — CIPROFLOXACIN 400 MG: 2 INJECTION, SOLUTION INTRAVENOUS at 23:15

## 2017-07-28 RX ADMIN — PIPERACILLIN AND TAZOBACTAM 3.38 G: 3; .375 INJECTION, POWDER, LYOPHILIZED, FOR SOLUTION INTRAVENOUS; PARENTERAL at 21:56

## 2017-07-28 RX ADMIN — ACETAMINOPHEN 650 MG: 325 TABLET, FILM COATED ORAL at 22:00

## 2017-07-28 RX ADMIN — POTASSIUM CHLORIDE 10 MEQ: 14.9 INJECTION, SOLUTION, CONCENTRATE PARENTERAL at 08:47

## 2017-07-28 RX ADMIN — POTASSIUM CHLORIDE 10 MEQ: 14.9 INJECTION, SOLUTION, CONCENTRATE PARENTERAL at 17:31

## 2017-07-28 RX ADMIN — CIPROFLOXACIN 400 MG: 2 INJECTION, SOLUTION INTRAVENOUS at 12:15

## 2017-07-28 RX ADMIN — PIPERACILLIN AND TAZOBACTAM 3.38 G: 3; .375 INJECTION, POWDER, LYOPHILIZED, FOR SOLUTION INTRAVENOUS; PARENTERAL at 15:57

## 2017-07-28 RX ADMIN — ACETAMINOPHEN 650 MG: 325 TABLET, FILM COATED ORAL at 14:38

## 2017-07-28 RX ADMIN — HYDROXYZINE HYDROCHLORIDE 25 MG: 25 TABLET ORAL at 23:15

## 2017-07-28 RX ADMIN — HYDROXYZINE HYDROCHLORIDE 25 MG: 25 TABLET ORAL at 23:36

## 2017-07-28 RX ADMIN — POTASSIUM CHLORIDE 10 MEQ: 14.9 INJECTION, SOLUTION, CONCENTRATE PARENTERAL at 07:08

## 2017-07-28 RX ADMIN — POTASSIUM CHLORIDE 10 MEQ: 14.9 INJECTION, SOLUTION, CONCENTRATE PARENTERAL at 05:33

## 2017-07-28 RX ADMIN — PIPERACILLIN AND TAZOBACTAM 3.38 G: 3; .375 INJECTION, POWDER, LYOPHILIZED, FOR SOLUTION INTRAVENOUS; PARENTERAL at 11:04

## 2017-07-28 NOTE — PLAN OF CARE
Problem: Goal Outcome Summary  Goal: Goal Outcome Summary  Outcome: No Change  D/I: Up ad roxane. Gait steady. C/o chills around 1600. Fever 101.6 Ax around 1830. C/o rigors with the fever. Stated that she was very uncomfortable with generalized body aches. Given Ibuprofen at that time. The pt also requested Hydroxyzine. Dr Beltrán was notified and the med was ordered. Dr Beltrán was also notified about the fever. Temp 97 Ax around 2030. Diaphoretic. Appetite poor. Ate only bites for supper. No c/o nausea. Voiding spontaneously.   P: Continue POC. Alert MD of changes in status.

## 2017-07-28 NOTE — PLAN OF CARE
Problem: Goal Outcome Summary  Goal: Goal Outcome Summary  Outcome: Declining  Tmax: 102.5, O2 sats 92-96% on RA, OVSS, A&O x 4, up independently in room, denies pain, SOB, N/V/D/C. Rigors and diaphoretic with fever. 400 mg Ibuprofen given. Blood cultures ordered. Pt is now c/o a new cough which is a dry, weak, non-productive cough. Needs 40 mEq potassium, 20 mEq given via PIV and pt states she will take the other 20 PO with breakfast. Pt offers no further complaints, will continue POC.

## 2017-07-28 NOTE — PROGRESS NOTES
[facility], [place]      Internal Medicine Progress Note - [facility] Service          Main Plans for Today      1) continue IV ciprofloxacin and add IV zosym  2) d/c IV fluids       Assessment & Plan     Tameka Hebert is an otherwise healthy 72 year old female admitted on 7/25/2017 with a 2 week onset of cyclical fevers, chills, and night sweats with associated nausea and vomiting and recent travel to [place] and [place] showing mild improvement on ciprofloxacin.      #fevers with nausea and vomiting and cholestatic LFTs: Ddx infectious vs. Rheumatologic vs. Malignancy. Infectious etiology most likely considering her elevated CRP and procalcitonin levels (69 and 0.64). Working hypothesis is possible infection from salmonella typhi as it can colonize in the gall bladder and cause recurrent fevers. Not presenting with typical jessenia spots on abdomen. Per ID, unlikely rickettsial infection since her previously started course of doxycycline should have shown marked clinical improvement. Doxy d/c'ed 7/26.  Patient on IV ciprofloxacin with improvement in procalcitnonin and CRP labs with continued elevation in bilirubin.  (Procalcitnonin 0.56 on 7/27, 0.47 on 7/28; CRP: 60 on 7/27, 54 on 7/28). Patient continues to spike fevers though with longer intervals where she remains asymptomatic. Concern for a possible obstructive process causing infection unlikely given negative findings from HIDA scan and AB-US. GI on board as well though deferring to ID at the present. Rheumatologic etiology less likely with normal ESR. BETH results were unremarkable and still awaiting Rf and BETH. Malignancy very unlikely given acute onset of symptoms and lack of weight loss but if infectious workup does not yield clinical improvement, can consider UPEP/SPEP.   1) monitor CBC and blood cultures, and trend CRP, and procalcitonin  2) await Rf, F-actin, Hep A, B, C, EBV, CMV, HSV PCRs  3) continue ciprofloxacin and add  "IV zosyn  4) d/c ibuprofen for fever control; switch to tylenol       #shortness of breath and chest tightness: patient complained of chest tightness and feeling \"winded\" on 7/28 in the AM. CXR and EKG ordered. EKG unremarkable and CXR showed some bibasilar pulmonary edema likely secondary to volume overload.   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 Recommend follow-up: Less than 1 cm: Follow-up imaging in 6 months to 1 year, then lengthen interval to 2-3 years if no change.            Diet: Room Service  Regular Diet Adult  Fluids: normal PO intake  DVT Prophylaxis: ambulate  Code Status: full code           Disposition Plan     Expected discharge: Pending resolution of fevers and appropriate PO intake without N and V, likely able to be discharged within 2-3 days.       Entered: Romero Lopez 07/28/2017, 6:45 PM      Information in the above section will display in the discharge planner report.        The patient's care was discussed with Frida Partida and [place].      Romero Lopez  [facility]  Maroon: 3  Pager: [phone]  Please see sticky note for cross cover information          Interval History      Patient spiked another fever of 102.5 reporting increased nausea and chills. Ibuprofen given. ROS negative other than what was previously in HPI.          Physical Exam  Vital Signs: Temp: 98.3  F (36.8  C) Temp src: Temporal BP: 123/70 Pulse: 86   Resp: 16 SpO2: 94 % O2 Device: None (Room air   General Appearance: Alert and oriented, conversant, lying in bed with blankets over her to keep her warm.   Respiratory: bibasilar inspiratory crackles, appropriate airflow in and out of lungs   Cardiovascular: normal S1 and S2, no r/m/g  GI: +BSx4, no RUQ tenderness to deep palpation, otherwise soft and nontender   Skin: no new rashes, " bruises, or skin lesions appreciated  Extremities: some trace nonpitting edema.        Data   Medications        piperacillin-tazobactam  3.375 g Intravenous Q6H     ciprofloxacin  400 mg Intravenous Q12H     sodium chloride (PF)  3 mL Intracatheter Q8H     sodium chloride (PF)  3 mL Intracatheter Q8H     Data     Recent Labs  Lab 07/28/17  0342 07/27/17  0341 07/26/17  0419 07/25/17  1324   WBC 6.8 6.4  --  5.5   HGB 10.6* 10.0*  --  11.5*   MCV 85 86  --  88    331  --  244   INR  --  1.12  --   --     137 135 138   POTASSIUM 3.3* 3.7 3.7 3.5   CHLORIDE 106 104 104 104   CO2 23 22 20 26   BUN 7 12 11 9   CR 0.43* 0.51* 0.53 0.50*   ANIONGAP 9 10 11 8   ROSANA 7.5* 7.7* 7.9* 8.3*   * 102* 81 96   ALBUMIN 1.7* 1.8* 2.0* 2.3*   PROTTOTAL 4.9* 5.0* 5.0* 5.7*   BILITOTAL 4.4* 3.6* 3.1* 2.8*   ALKPHOS 518* 537* 534* 594*   * 262* 308* 350*   AST 71* 116* 145* 132*   LIPASE  --   --   --  134     Recent Results (from the past 24 hour(s))   XR Chest Port 1 View    Narrative    Exam: XR CHEST PORT 1 VW, 7/28/2017 9:58 AM    Indication: Dyspnea    Comparison: 7/25/2017, 2/26/2010    Findings:   A single AP view of the chest was obtained. The cardiomediastinal  silhouette is within normal limits. Mildly decreased lung volumes.  Small left pleural effusion. No pneumothorax. Mild bibasilar  opacities. The upper abdomen is unremarkable.      Impression    Impression:   1. Increased bibasilar atelectasis/consolidation, worrisome for  infection or edema.  2. Small left pleural effusion, increased.    I have personally reviewed the examination and initial interpretation  and I agree with the findings.    JEFF LOGAN MD

## 2017-07-28 NOTE — PLAN OF CARE
Problem: Goal Outcome Summary  Goal: Goal Outcome Summary  Outcome: No Change  Temp max 102.2  temporal around 2:30pm. Gave tylenol. Team notified. On IV zosyn and Cipro. Temp down to 98.3 at 4pm. Pt sitting up in chair eating supper. Ate 50% of her dinner. Denies pain or nausea. Ambulates in halls earlier today. K+ replaced with 10meq IV x2. Pt already got 2 bags of K+ on night shift. Re check sched for 8pm. Up in room independently. Reports 1 soft bm.

## 2017-07-28 NOTE — PROGRESS NOTES
[facility], [place]   General Infectious Disease Progress Note     Patient:  Tameka Hebert, Date of birth 1945, Medical record number [id number]  Date of Visit:  July 25, 2017  Date of Admission: 7/25/2017  Consult Requester:Vanessa Cuello MD            Assessment and Recommendations:   ASSESSMENT:  1. Subacute ascending cholangitis / cholecystitis.  -- non-typhoidal salmonella infection could be a possible/probable unifying diagnosis, and one does not need diarrhea to have salmonellosis.  Cholangitis as well as liver infection can occur in older persons.  Biliary tract colonization is common.   -- This is obviously not acute cholecystitis with obstruction based on the clinical presentation and physical examination.   -- the fever, rigors, elevated procalcitonin and elevated CRP (which are now trending with improvement on antibiotics = bacterial process)   Viral infections don't elevated procalcitonin or CRP.  -- Viral work up negative with common serologies including Hep A,B,C, CMV IgM, and EBV IgM.  The elevated procalcitonin and CRP is not compatible with viral process.  -- Still febrile at 36 hours of antibiotics. This is not unexpected. CRP and procalcitonin at downtrending,     2. Hematologic abnormalities. Initial lymphopenia and thrombocytopenia noted at OSH now resolving. Currently normocytic, normochromic anemia.  Leukopenia can be seen with Salmonella    3. Outdoor exposures unlikely related. Travel history (i.e. eating in restaurants) is likely related.      RECOMMENDATION:  1.  Add zosyn 3.375g IV Q6hr as a beta-lactam to further jump start /accelerate resolution of illness.   2. Continue ciprofloxacin.  Longer term plan will be cipro 500mg PO BID for a ~14 day course in total.      We expect her to slowly improve.       Pending as rec by GI: HSV-1 & 2, CMV PCR, EBV PCR.      DISCUSSION  Ms. Hebert is a 72-year-old female with a recent history of hiking in [place] and  [place] presents with fever of unknown origin. Initial infectious work-up including Anaplasmosis, Ehrlichiosis, Babesiosis, EBV, CMV, HIV, West Nile Virus, HAV, HBV, HCV, acute Parvovirus B19 infection has been negative thus far. Moreover, recent CXR was negative and MRI of abdomen was negative for abscess and/or malignancy. Blood cultures thus far have shown no growth to date. Furthermore, she received Doxycycline from 7/20-7/26 without any resolution of her symptoms. History of intermittent rigors is suggestive of possible/probable intermittent bacteremia. An elevated CRP of 54 mg/L is trending downward (69->60->54 mg/L on cipro) and mildly elevated procalcitonin 0.64 ng/mL -> 0.56 -> 0.47 ng/mL is also improving. This pattern is consistent with a bacterial process and is not compatible with viral etiology.     Subacute cholecystitis most likely at this time due to elevated LFTs and hyperbilirubinemia in conjunction with 7/26 MR-abdomen findings concerning for a subacute, non-obstructed ascending cholangitis. Physical exam is not consistent with acute cholescystitis; however, MRI findings are clearly abnormal (as are LFTs), thus targeting enteric sammi is reasonable. Salmonella could be a unifying diagnosis.   ________________________________________________________________    Scribe Disclosure:   I, Juan Redd, am serving as a scribe; to document services personally performed by Colton Liu -based on data collection and the provider's statements to me.   ________________________________________________________________    Consult Question: fever of unknown origin  Admission Diagnosis: fever   elevated billirubin  Fever         History of Present Illness:   Ms. Hebert is a 72-year-old female with an unremarkable PMH who was admitted to [facility] on 7/25 at the behest of her PCP who noted absolute lymphopenia on CBC. On 7/15 shortly after retuning from a hiking trip in [place] from 7/8-7/15, she experienced  "occipital HAs, increased fatigue, chills, and fevers (Tmax 102) that occured every 4-6 hours \"like clockwork.\" Moreover, she reported myalgias of the lower extremities bilaterally, neck, and shoulders. Of note, she visited [place] 6/9-6/23 where she removed ticks from others but denies getting bitten herself. She denies other insect bites, rash, sick contacts, and eating undercooked meats. Moreover, she denied any diarrhea, hematochezia, abdominal pain, cough, CP, SOB, jaw pain/fatigue, transient vision loss.     On 7/20, her PCP started Doxycycline for 10 days for presumed tick-borne illness. She was negative for Lyme, Anaplasma and Ehrlichia at the time. However, she was noted to have elevated LFTs (, , , Tbili 0.6) at the time. The following day, [facility] due to an episode of non-bilious, non-bloody emesis. In the ED, workup for Lyme, hep A, B, C, anaplasma, erlichia, CMV, and EBV were negative. An AB-US was performed which was unremarkable. Patient was discharged to home with recommendations to maintain hydration, continue ibuprofen for cyclical fevers, and continue doxycycline course. Because the patient felt worse, she returned to her PCP on 7/24, where she was noted to have elevated liver enzymes (, , alk phos 717, t.bili 2.5 and direct bili 2.0). Due to cyclic fevers with chills and sweats in addition to her elevated liver enzymes, she was admitted to [facility]. Of note, she denies having pets at home, living on or near a farm, taking any outpatient medications.    Since admission, the patient has been intermittently febrile (Tmax 100.7 on 7/25) and normotensive. CXR on 7/25 was negative for infiltrates, consolidation, acute processes. 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 7/26 HIDA scan showed no evidence of acute cholecystitis and poor washout of the radiotracer from the liver consistent with hepatocellular dysfunction. 7/27 RUQ US shows circumferential gallbladder wall thickening with cholelithiasis, and no evidence of acute cholecystitis based on earlier HIDA scan.      7/26 Blood culture X1 and parasite stain negative. Initial infectious work-up including Anaplasmosis, Ehrlichiosis, Babesiosis, EBV, CMV, HIV, West Nile Virus, HAV, HBV, HCV, acute Parvovirus B19 infection has been negative thus far. Moreover, the patient has been found to be negative for C. burnetti antibodies, F Tularensis antibodies, and Rickettsia antibodies. BETH was positive at a low titer: 1:40. The On 7/26 doxycycline was discontinued due to negative tickborne disease work-up and symptom persistence despite 7 days of therapy. On 7/26, IV ciprofloxacin was started. Tmax 102.5 this morning. CRP is trending down from 77 on admission to 54 today. Procalcitonin also trending downward 0.47 - 0.56 - 0.64. The patient's Tbili is trending up 4.4 - 3.6 - 3.1 - 2.8 (direct 3.5). ALT, AST, and ALP trending down.    This morning the patient reports feeling \"no different that yesterday, if anything worse.\" She reports chills and decreased PO intake: took a few bites of a hamburger last night. Moreover, she reports feeling like she \"cannot take a good breathe\" associated with \"a fullness in the chest.\" She denies vomiting, diarrhea, chest pain.     Tmax this morning 102.5  Her WC is 6.8, CRP continue to trend downward 54 - 60 - 69 - 77, Procalc also trending down 0.47 - 0.56 - 0.64   Tbili is trending up 4.4 - 3.6 - 3.1 - 2.8 (direct 3.5). ALT, AST, and ALP trending down.    Cont cipro, consider adding metronidazole             Review of Systems:   CONSTITUTIONAL:  Positive for fevers, chills, and diaphoresis  EYES: negative for " icterus  ENT:  negative for hearing loss, tinnitus and sore throat  RESPIRATORY:  negative for cough with sputum and dyspnea  CARDIOVASCULAR:  negative for chest pain, dyspnea  GASTROINTESTINAL:  Positive for nausea and vomiting. negative for diarrhea and constipation  GENITOURINARY:  negative for dysuria  HEME:  No easy bruising  INTEGUMENT:  negative for rash and pruritus  NEURO:  Negative for headache           Past Medical History:   No past medical history on file.         Past Surgical History:   No past surgical history on file.         Family History:   No family history on file.         Social History:     Social History   Substance Use Topics     Smoking status: Not on file     Smokeless tobacco: Not on file     Alcohol use Not on file            Current Medications (antimicrobials listed in bold):       ciprofloxacin  400 mg Intravenous Q12H     sodium chloride (PF)  3 mL Intracatheter Q8H     sodium chloride (PF)  3 mL Intracatheter Q8H            Allergies:     Allergies   Allergen Reactions     Codeine Nausea and Vomiting            Physical Exam:   Vitals were reviewed  Patient Vitals for the past 24 hrs:   BP Temp Temp src Pulse Resp SpO2 Weight   07/28/17 0529 - 102.5  F (39.2  C) Temporal - - - -   07/28/17 0344 107/57 99.8  F (37.7  C) Temporal 80 16 - -   07/27/17 2308 108/68 98.1  F (36.7  C) Temporal 78 14 95 % -   07/27/17 2032 112/67 97  F (36.1  C) Axillary 89 16 98 % -   07/27/17 1834 - 101.6  F (38.7  C) Axillary 98 - 90 % -   07/27/17 1745 - 98.1  F (36.7  C) Oral - - - -   07/27/17 1620 114/69 97.8  F (36.6  C) Oral 83 16 - -   07/27/17 1126 104/62 96.9  F (36.1  C) Axillary 74 16 91 % -   07/27/17 0843 98/60 100.2  F (37.9  C) Axillary 81 16 95 % 71.3 kg (157 lb 3 oz)     Ranges for his vital signs:  Temp:  [96.9  F (36.1  C)-102.5  F (39.2  C)] 102.5  F (39.2  C)  Pulse:  [74-98] 80  Resp:  [14-16] 16  BP: ()/(57-69) 107/57  SpO2:  [90 %-98 %] 95 %    Physical  Examination:  GENERAL:  well-developed, well-nourished, in bed in no acute distress.  HEENT:  Head is normocephalic, atraumatic   EYES:  Eyes have mildly icteric sclerae without conjunctival injection or stigmata of endocarditis.    ENT:  Oropharynx is moist without exudates or ulcers. Tongue is midline  NECK:  Supple. No  Cervical lymphadenopathy  LUNGS:  Bilateral rales in the lung bases.   CARDIOVASCULAR:  Regular rate and rhythm with no murmurs, gallops or rubs.  ABDOMEN:  Normal bowel sounds, soft, nontender. No appreciable hepatosplenomegaly. Negative Cortés's sign   SKIN:  No acute rashes. No stigmata of endocarditis. Mildly jaundice.   NEUROLOGIC:  Grossly nonfocal. Active x4 extremities          Laboratory Data:     CRP Inflammation   Date Value Ref Range Status   07/28/2017 54.0 (H) 0.0 - 8.0 mg/L Final   07/27/2017 60.0 (H) 0.0 - 8.0 mg/L Final   07/26/2017 69.0 (H) 0.0 - 8.0 mg/L Final   07/25/2017 77.0 (H) 0.0 - 8.0 mg/L Final     Creatinine   Date Value Ref Range Status   07/28/2017 0.43 (L) 0.52 - 1.04 mg/dL Final   07/27/2017 0.51 (L) 0.52 - 1.04 mg/dL Final   07/26/2017 0.53 0.52 - 1.04 mg/dL Final   07/25/2017 0.50 (L) 0.52 - 1.04 mg/dL Final   10/08/2014 0.74 0.52 - 1.04 mg/dL Final     WBC   Date Value Ref Range Status   07/28/2017 6.8 4.0 - 11.0 10e9/L Final   07/27/2017 6.4 4.0 - 11.0 10e9/L Final   07/25/2017 5.5 4.0 - 11.0 10e9/L Final   10/08/2014 4.1 4.0 - 11.0 10e9/L Final   03/05/2014 3.5 (L) 4.0 - 11.0 10e9/L Final     Hemoglobin   Date Value Ref Range Status   07/28/2017 10.6 (L) 11.7 - 15.7 g/dL Final     MCV   Date Value Ref Range Status   07/28/2017 85 78 - 100 fl Final     Platelet Count   Date Value Ref Range Status   07/28/2017 380 150 - 450 10e9/L Final     Sed Rate   Date Value Ref Range Status   07/26/2017 27 0 - 30 mm/h Final     ALT   Date Value Ref Range Status   07/28/2017 207 (H) 0 - 50 U/L Final   07/27/2017 262 (H) 0 - 50 U/L Final   07/26/2017 308 (H) 0 - 50 U/L Final    07/25/2017 350 (H) 0 - 50 U/L Final   02/26/2010 15 0 - 50 U/L Final     AST   Date Value Ref Range Status   07/28/2017 71 (H) 0 - 45 U/L Final   07/27/2017 116 (H) 0 - 45 U/L Final   07/26/2017 145 (H) 0 - 45 U/L Final     Bilirubin Total   Date Value Ref Range Status   07/28/2017 4.4 (H) 0.2 - 1.3 mg/dL Final   07/27/2017 3.6 (H) 0.2 - 1.3 mg/dL Final   07/26/2017 3.1 (H) 0.2 - 1.3 mg/dL Final     No results found for: CMVPCR, CMQNT, CMVQ, HSVSP, HSVPC, EBVDN  Lab Results   Component Value Date     07/28/2017    BUN 7 07/28/2017       Attending Physician Attestation:  I have seen and evaluated Tameka Hebert. I have discussed with the house staff team or resident(s), and I agree with the above documented findings and plan in this note. I have reviewed today's vital signs, medications, labs and imaging.  If a medical student was involved in this note, they were serving in a capacity as a scribe.  Floor time: 30 minutes  Face-to-face time: 30 minutes  Total time: 60 minutes     Colton Liu MD MPH  [facility]   Division of Infectious Diseases & International Medicine  Department of Medicine

## 2017-07-29 ENCOUNTER — APPOINTMENT (OUTPATIENT)
Dept: GENERAL RADIOLOGY | Facility: CLINIC | Age: 72
DRG: 372 | End: 2017-07-29
Attending: INTERNAL MEDICINE
Payer: MEDICARE

## 2017-07-29 LAB
ALBUMIN SERPL-MCNC: 1.6 G/DL (ref 3.4–5)
ALP SERPL-CCNC: 489 U/L (ref 40–150)
ALT SERPL W P-5'-P-CCNC: 166 U/L (ref 0–50)
ANION GAP SERPL CALCULATED.3IONS-SCNC: 9 MMOL/L (ref 3–14)
AST SERPL W P-5'-P-CCNC: 47 U/L (ref 0–45)
BILIRUB DIRECT SERPL-MCNC: 4 MG/DL (ref 0–0.2)
BILIRUB SERPL-MCNC: 4.9 MG/DL (ref 0.2–1.3)
BUN SERPL-MCNC: 6 MG/DL (ref 7–30)
CALCIUM SERPL-MCNC: 7.5 MG/DL (ref 8.5–10.1)
CHLORIDE SERPL-SCNC: 104 MMOL/L (ref 94–109)
CMV DNA SPEC QL NAA+PROBE: NORMAL
CO2 SERPL-SCNC: 25 MMOL/L (ref 20–32)
CREAT SERPL-MCNC: 0.46 MG/DL (ref 0.52–1.04)
CRP SERPL-MCNC: 50 MG/L (ref 0–8)
ERYTHROCYTE [DISTWIDTH] IN BLOOD BY AUTOMATED COUNT: 14.9 % (ref 10–15)
GFR SERPL CREATININE-BSD FRML MDRD: ABNORMAL ML/MIN/1.7M2
GLUCOSE SERPL-MCNC: 110 MG/DL (ref 70–99)
HCT VFR BLD AUTO: 29.2 % (ref 35–47)
HGB BLD-MCNC: 10.1 G/DL (ref 11.7–15.7)
MAGNESIUM SERPL-MCNC: 1.7 MG/DL (ref 1.6–2.3)
MCH RBC QN AUTO: 29.2 PG (ref 26.5–33)
MCHC RBC AUTO-ENTMCNC: 34.6 G/DL (ref 31.5–36.5)
MCV RBC AUTO: 84 FL (ref 78–100)
NT-PROBNP SERPL-MCNC: ABNORMAL PG/ML (ref 0–900)
PLATELET # BLD AUTO: 437 10E9/L (ref 150–450)
POTASSIUM SERPL-SCNC: 3.1 MMOL/L (ref 3.4–5.3)
POTASSIUM SERPL-SCNC: 3.3 MMOL/L (ref 3.4–5.3)
PROCALCITONIN SERPL-MCNC: 0.41 NG/ML
PROT SERPL-MCNC: 4.9 G/DL (ref 6.8–8.8)
RBC # BLD AUTO: 3.46 10E12/L (ref 3.8–5.2)
SODIUM SERPL-SCNC: 139 MMOL/L (ref 133–144)
SPECIMEN SOURCE: NORMAL
WBC # BLD AUTO: 6.1 10E9/L (ref 4–11)

## 2017-07-29 PROCEDURE — 82248 BILIRUBIN DIRECT: CPT | Performed by: STUDENT IN AN ORGANIZED HEALTH CARE EDUCATION/TRAINING PROGRAM

## 2017-07-29 PROCEDURE — 25000132 ZZH RX MED GY IP 250 OP 250 PS 637: Mod: GY | Performed by: INTERNAL MEDICINE

## 2017-07-29 PROCEDURE — 80053 COMPREHEN METABOLIC PANEL: CPT | Performed by: STUDENT IN AN ORGANIZED HEALTH CARE EDUCATION/TRAINING PROGRAM

## 2017-07-29 PROCEDURE — 36415 COLL VENOUS BLD VENIPUNCTURE: CPT | Performed by: INTERNAL MEDICINE

## 2017-07-29 PROCEDURE — 71010 XR CHEST PORT 1 VW: CPT

## 2017-07-29 PROCEDURE — 25000128 H RX IP 250 OP 636: Performed by: STUDENT IN AN ORGANIZED HEALTH CARE EDUCATION/TRAINING PROGRAM

## 2017-07-29 PROCEDURE — 25000132 ZZH RX MED GY IP 250 OP 250 PS 637: Mod: GY | Performed by: STUDENT IN AN ORGANIZED HEALTH CARE EDUCATION/TRAINING PROGRAM

## 2017-07-29 PROCEDURE — 36415 COLL VENOUS BLD VENIPUNCTURE: CPT | Performed by: STUDENT IN AN ORGANIZED HEALTH CARE EDUCATION/TRAINING PROGRAM

## 2017-07-29 PROCEDURE — 84145 PROCALCITONIN (PCT): CPT | Performed by: STUDENT IN AN ORGANIZED HEALTH CARE EDUCATION/TRAINING PROGRAM

## 2017-07-29 PROCEDURE — A9270 NON-COVERED ITEM OR SERVICE: HCPCS | Mod: GY | Performed by: INTERNAL MEDICINE

## 2017-07-29 PROCEDURE — 83880 ASSAY OF NATRIURETIC PEPTIDE: CPT | Performed by: STUDENT IN AN ORGANIZED HEALTH CARE EDUCATION/TRAINING PROGRAM

## 2017-07-29 PROCEDURE — 20000002 ZZH R&B BMT INTERMEDIATE

## 2017-07-29 PROCEDURE — 25000128 H RX IP 250 OP 636: Performed by: INTERNAL MEDICINE

## 2017-07-29 PROCEDURE — 83735 ASSAY OF MAGNESIUM: CPT | Performed by: INTERNAL MEDICINE

## 2017-07-29 PROCEDURE — 99232 SBSQ HOSP IP/OBS MODERATE 35: CPT | Mod: GC | Performed by: INTERNAL MEDICINE

## 2017-07-29 PROCEDURE — 40000141 ZZH STATISTIC PERIPHERAL IV START W/O US GUIDANCE

## 2017-07-29 PROCEDURE — 85027 COMPLETE CBC AUTOMATED: CPT | Performed by: STUDENT IN AN ORGANIZED HEALTH CARE EDUCATION/TRAINING PROGRAM

## 2017-07-29 PROCEDURE — 86140 C-REACTIVE PROTEIN: CPT | Performed by: STUDENT IN AN ORGANIZED HEALTH CARE EDUCATION/TRAINING PROGRAM

## 2017-07-29 PROCEDURE — A9270 NON-COVERED ITEM OR SERVICE: HCPCS | Mod: GY | Performed by: STUDENT IN AN ORGANIZED HEALTH CARE EDUCATION/TRAINING PROGRAM

## 2017-07-29 PROCEDURE — 84132 ASSAY OF SERUM POTASSIUM: CPT | Performed by: INTERNAL MEDICINE

## 2017-07-29 RX ORDER — POTASSIUM CHLORIDE 750 MG/1
20 TABLET, EXTENDED RELEASE ORAL ONCE
Status: COMPLETED | OUTPATIENT
Start: 2017-07-29 | End: 2017-07-29

## 2017-07-29 RX ORDER — FUROSEMIDE 10 MG/ML
20 INJECTION INTRAMUSCULAR; INTRAVENOUS ONCE
Status: COMPLETED | OUTPATIENT
Start: 2017-07-29 | End: 2017-07-29

## 2017-07-29 RX ORDER — URSODIOL 300 MG/1
300 CAPSULE ORAL 2 TIMES DAILY
Status: CANCELLED | OUTPATIENT
Start: 2017-07-29

## 2017-07-29 RX ADMIN — PIPERACILLIN AND TAZOBACTAM 3.38 G: 3; .375 INJECTION, POWDER, LYOPHILIZED, FOR SOLUTION INTRAVENOUS; PARENTERAL at 16:19

## 2017-07-29 RX ADMIN — FUROSEMIDE 20 MG: 10 INJECTION, SOLUTION INTRAVENOUS at 10:29

## 2017-07-29 RX ADMIN — POTASSIUM CHLORIDE 40 MEQ: 750 TABLET, EXTENDED RELEASE ORAL at 05:58

## 2017-07-29 RX ADMIN — POTASSIUM CHLORIDE 40 MEQ: 750 TABLET, EXTENDED RELEASE ORAL at 18:19

## 2017-07-29 RX ADMIN — HYDROXYZINE HYDROCHLORIDE 50 MG: 25 TABLET ORAL at 22:38

## 2017-07-29 RX ADMIN — PIPERACILLIN AND TAZOBACTAM 3.38 G: 3; .375 INJECTION, POWDER, LYOPHILIZED, FOR SOLUTION INTRAVENOUS; PARENTERAL at 22:38

## 2017-07-29 RX ADMIN — POTASSIUM CHLORIDE 20 MEQ: 750 TABLET, EXTENDED RELEASE ORAL at 10:29

## 2017-07-29 RX ADMIN — ACETAMINOPHEN 650 MG: 325 TABLET, FILM COATED ORAL at 09:14

## 2017-07-29 RX ADMIN — CIPROFLOXACIN 400 MG: 2 INJECTION, SOLUTION INTRAVENOUS at 13:04

## 2017-07-29 RX ADMIN — ACETAMINOPHEN 650 MG: 325 TABLET, FILM COATED ORAL at 15:07

## 2017-07-29 RX ADMIN — POTASSIUM CHLORIDE 20 MEQ: 750 TABLET, EXTENDED RELEASE ORAL at 20:31

## 2017-07-29 RX ADMIN — PIPERACILLIN AND TAZOBACTAM 3.38 G: 3; .375 INJECTION, POWDER, LYOPHILIZED, FOR SOLUTION INTRAVENOUS; PARENTERAL at 10:29

## 2017-07-29 RX ADMIN — POTASSIUM CHLORIDE 20 MEQ: 750 TABLET, EXTENDED RELEASE ORAL at 08:37

## 2017-07-29 RX ADMIN — PIPERACILLIN AND TAZOBACTAM 3.38 G: 3; .375 INJECTION, POWDER, LYOPHILIZED, FOR SOLUTION INTRAVENOUS; PARENTERAL at 04:11

## 2017-07-29 NOTE — PLAN OF CARE
Problem: Infection, Risk/Actual (Adult)  Goal: Identify Related Risk Factors and Signs and Symptoms  Related risk factors and signs and symptoms are identified upon initiation of Human Response Clinical Practice Guideline (CPG)   Outcome: Improving    07/29/17 0611   Infection, Risk/Actual   Signs and Symptoms (Infection, Risk/Actual) body temperature changes;chills;diaphoresis;malaise;pain;weakness      VSS with exception of fevers, temp max 102.3. Given tylenol x1. Pt complains of headache, malaise and weakness. Walked in halls x2 laps on eves. Complained of SOB with exertion, weight up 16 lbs since admission, given 20mg lasix, 850cc out. O2 sats 87% on room air when trying to sleep, 2L NC applied. Atarax given at HS for sleep.  Given 40meq PO KCl this morning, will need 20 meq PO with breakfast as well and a recheck later this morning. Independent in room, steady on her feet. Pt is anxious to get home as her grandkids are in town from [place].

## 2017-07-29 NOTE — PROGRESS NOTES
[facility] Internal Medicine Progress Note - Maroon 3    Tameka Hebert MRN# [id number]   Age: 72 year old YOB: 1945           Assessment and Plan:   Assessment:  Tameka Hebert is a 72 year old with no significant past medical history who presents with fevers, chills, and elevated liver enzymes.     Plan:  Today:    - Continue IV ciprofloxacin (started 07/26/17); per ID added IV zosyn 07/28/17. 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 Fever  #. Acute hepatitis c/w cholestatic picture  Still experiencing chills and myalgias. LFTs improving but with continued hyperbilirubinemia. Workup at OSH negative for: lyme, anaplasma, ehrlichia, HIV, hepatitis panel, babesia, mononucleosis, [place], EBV/CMV. No suspicion for acute cholecystitis from obstruction 2/2 negative imaging and no RUQ pain. Rheumatologic causes like autoimmune hepatitis are less likely but labs pending.   - Continue ciprofloxacin per ID, plan for 14 day total course (07/26/17-08/08/17); added zosyn on 07/28/17    - Consider ursodiol to aid bilirubin flow   - D/C'd doxycycline on 07/26/17   - IVF d/c'd 2/2 new crackles on lung exam and CXR with some mild pulmonary edema   - BETH 1:40. RF pending  - CRP elevated but ESR wnl. CRP and procalcitonin slowly trending down  - CK wnl  - CMV, EBV, HSV negative   - No anaplasma, babesia, or ehrlichia found on parasite smear   - BCx negative to date   - UA not  suspicious for UTI   - MRCP, HIDA scan, RUQ US all negative for acute cholecystitis or other gallbladder pathology       #. Dyspnea w/ chest tightness likely 2/2 fluid overload    Hemodynamically stable but crackles advanced to mid-lung fields. Chest pain not typical of ACS. No tachycardia, no LE edema and calves symmetric in diameter. CXR 07/28/17 w/ atelectasis/consolidation and increased small left pleural effusion. Likely 2/2 fluid overload.   - 20mg lasix on 07/28/17; >800 cc urine output over night   - Additional 20mg lasix today in am  - Repeat CXR 07/29/17 w/ increased small bibasilar pleural effusions  - BNP 10,807   - Strict I/O's   - D/C'd IVF   - Monitor vs    - Consider echocardiogram in future   - If tachycardia develops, new chest pain, increased dyspnea consider further workup for PE and/or ACS    FEN: IVF w/  cc/hr, full diet   PPX: No PPI indicated at this time, mechanical prophylaxis   CODE: FULL      Patient was discussed with staff attending, Dr. Cuello, who agrees with the above assessment & plan    Cee Partida MD/MPH  Internal Medicine/Dermatology   PGY-1  Pager: [phone]        Interval History:   Nursing notes reviewed and noted. Patient still had a fever overnight. She required 2L of oxygen via NC overnight for dyspnea as well.  This morning is doing well on room air though. Says her breathing is a little better after the lasix but that she still doesn't feel at her baseline. Appetite is improving.        4 point ROS including Respiratory, CV, GI and , other than that noted in the HPI,  is negative          Medications:     Current Facility-Administered Medications   Medication     furosemide (LASIX) injection 20 mg     potassium chloride SA (K-DUR/KLOR-CON M) CR tablet 20-40 mEq     potassium chloride (KLOR-CON) Packet 20-40 mEq     potassium chloride 10 mEq in 100 mL intermittent infusion     potassium chloride 10 mEq in 100 mL intermittent infusion with 10 mg lidocaine  "    potassium chloride 20 mEq in 50 mL intermittent infusion     piperacillin-tazobactam (ZOSYN) 3.375 g vial to attach to  mL bag     hydrOXYzine (ATARAX) tablet 50 mg     acetaminophen (TYLENOL) tablet 650 mg     ciprofloxacin (CIPRO) infusion 400 mg     naloxone (NARCAN) injection 0.1-0.4 mg     sodium chloride (PF) 0.9% PF flush 3 mL     sodium chloride (PF) 0.9% PF flush 3 mL     lidocaine 1 % 1 mL     lidocaine (LMX4) kit     sodium chloride (PF) 0.9% PF flush 3 mL     sodium chloride (PF) 0.9% PF flush 3 mL     melatonin tablet 1 mg     ondansetron (ZOFRAN) injection 4 mg             Physical Exam:   Vitals were reviewed  Blood pressure 113/66, pulse 72, temperature 98.4  F (36.9  C), temperature source Oral, resp. rate 16, height 1.8 m (5' 10.87\"), weight 71.9 kg (158 lb 9.6 oz), SpO2 95 %.    Intake/Output Summary (Last 24 hours) at 07/26/17 1519  Last data filed at 07/26/17 1125   Gross per 24 hour   Intake           2727.5 ml   Output              626 ml   Net           2101.5 ml     Vitals:    07/27/17 0843 07/28/17 0830 07/29/17 0821   Weight: 71.3 kg (157 lb 3 oz) 72.6 kg (160 lb 1.6 oz) 71.9 kg (158 lb 9.6 oz)       Physical Exam:  General: Alert, not in respiratory or painful distress  HEENT: anicteric sclera, PERRL, EOMI, mucous membranes dry, OP unobstructed, w/o erythema/discharge; no cervical LAD, FROM  CV: RRR, nl S1/S2, no S3/S4 appreciated, no m/r/g; intact & symmetric 3+ pulses (radial, DP)  Lungs: Bibasilar crackles. No use of accessory muscles. No wheezing or rhonchi.   Abd: Obese/full/flat, soft/tense, moves with respiration, BS+/-, not tender, not distended, no palpable organomegaly, no masses   Ext: WWP, no BLE edema. Calves equal in circumference and no focal areas of increased warmth.   Skin: no rashes, cyanosis, or jaundice  Neuro: AOx3, no focal neurologic deficits         Data:   ROUTINE LABS (Last four results)  CMP    Recent Labs  Lab 07/29/17 0410 07/28/17 1941 " 07/28/17 0342 07/27/17 0341 07/26/17  0419     --  138 137 135   POTASSIUM 3.1* 3.9 3.3* 3.7 3.7   CHLORIDE 104  --  106 104 104   CO2 25  --  23 22 20   ANIONGAP 9  --  9 10 11   *  --  100* 102* 81   BUN 6*  --  7 12 11   CR 0.46*  --  0.43* 0.51* 0.53   GFRESTIMATED >90Non  GFR Calc  --  >90Non  GFR Calc >90Non  GFR Calc >90Non  GFR Calc   GFRESTBLACK >90African American GFR Calc  --  >90African American GFR Calc >90African American GFR Calc >90African American GFR Calc   ROSANA 7.5*  --  7.5* 7.7* 7.9*   PROTTOTAL 4.9*  --  4.9* 5.0* 5.0*   ALBUMIN 1.6*  --  1.7* 1.8* 2.0*   BILITOTAL 4.9*  --  4.4* 3.6* 3.1*   ALKPHOS 489*  --  518* 537* 534*   AST 47*  --  71* 116* 145*   *  --  207* 262* 308*     CBC    Recent Labs  Lab 07/29/17 0410 07/28/17 0342 07/27/17 0341 07/25/17  1324   WBC 6.1 6.8 6.4 5.5   RBC 3.46* 3.62* 3.43* 3.94   HGB 10.1* 10.6* 10.0* 11.5*   HCT 29.2* 30.7* 29.4* 34.5*   MCV 84 85 86 88   MCH 29.2 29.3 29.2 29.2   MCHC 34.6 34.5 34.0 33.3   RDW 14.9 14.8 15.0 14.5    380 331 244     INR    Recent Labs  Lab 07/27/17 0341   INR 1.12     Arterial Blood GasNo lab results found in last 7 days.  I&O's: I/O last 3 completed shifts:  In: 2220 [P.O.:1140; I.V.:1080]  Out: 1825 [Urine:1825]    #. CXR 07/29/17   IMPRESSION: Increased small bilateral pleural effusions with  associated basilar atelectasis/consolidation.    #. CXR 07/28/17    Impression:   1. Increased bibasilar atelectasis/consolidation, worrisome for  infection or edema.  2. Small left pleural effusion, increased.    #. MRI Abdomen 07/25/17   IMPRESSION:   1.  Cholelithiasis and moderate amount of pericholecystic inflammatory  changes and fluid. While no obstructing stone is identified,  correlation for symptoms of right upper quadrant pain and potentially  right upper quadrant ultrasound would be beneficial.  2. Mild hepatomegaly with elongated  left lobe of the liver extending  up to the left upper quadrant. Scattered liver cysts. No solid focal  liver lesions.  3. 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 Recommend follow-up according to criteria described below in  the present report.    #. US Abdomen 07/27/17   Impression:   1.  Circumferential gallbladder wall thickening measuring up to 13 mm,  with cholelithiasis, overall similar to seen on MRI of 7/25/2017.  Nuclear medicine hepatobiliary scan performed yesterday demonstrated  no evidence of acute cholecystitis.  2.  Small right pleural effusion.  3.  Normal hepatic Doppler study.    #. HIDA Scan 07/26/17   Impression:  1. No evidence of acute cholecystitis.  2. Poor washout of radiotracer from the liver, consistent with  hepatocellular dysfunction.          Physician Attestation  I, Vanessa Cuello MD, saw this patient with the resident and agree with the resident s findings and plan of care as documented in the resident s note with my edits.     I personally reviewed vital signs, medications, labs and imaging.    Vanessa Cuello MD  Date of Service (when I saw the patient): 7/29/17

## 2017-07-29 NOTE — PLAN OF CARE
Problem: Infection, Risk/Actual (Adult)  Goal: Identify Related Risk Factors and Signs and Symptoms  Related risk factors and signs and symptoms are identified upon initiation of Human Response Clinical Practice Guideline (CPG)   Outcome: Improving  Patient without fever this shift, had an slight increase in the AM, given tylenol to help prevent a spike. Chest discomfort this afternoon with some tingling in her feet. Notified resident and she came by to see. Doesn't feel it is heart related, more lung related. Given tylenol for pain, good response to her lasix in the morning. Will check her K+ and Mag+ at 1600.

## 2017-07-29 NOTE — PROGRESS NOTES
[facility], [place]   General Infectious Disease Progress Note     Patient:  Tameka Hebert, Date of birth 1945, Medical record number [id number]  Date of Visit:  July 25, 2017  Date of Admission: 7/25/2017  Consult Requester:Vanessa Cuello MD            Assessment and Recommendations:   ASSESSMENT:  1. Subacute ascending cholangitis / cholecystitis.  -- non-typhoidal salmonella infection could be a possible/probable unifying diagnosis, and one does not need diarrhea to have salmonellosis.  Cholangitis as well as liver infection can occur in older persons.  Biliary tract colonization is common.   -- This is obviously not acute cholecystitis with obstruction based on the clinical presentation and physical examination.   -- the fever, rigors, elevated procalcitonin and elevated CRP (which are now trending with improvement on antibiotics = bacterial process)   Viral infections don't elevated procalcitonin or CRP.  -- Viral work up negative with common serologies including Hep A,B,C, CMV IgM, and EBV IgM.  The elevated procalcitonin and CRP is not compatible with viral process.  -- Still febrile at 36 hours of antibiotics. This is not unexpected. CRP and procalcitonin at downtrending,     2. Hematologic abnormalities. Initial lymphopenia and thrombocytopenia noted at OSH now resolving. Currently normocytic, normochromic anemia.  Leukopenia can be seen with Salmonella    3. Outdoor exposures unlikely related. Travel history (i.e. eating in restaurants) is likely related.      RECOMMENDATION:  Continue current plan. Expect her to slowly improve.  Could consider ursodiol to attempt to counter cholestasis.   Eventually medication will be ciprofloxacin 500mg PO BID x10 more days. Could be switched at any time.    [employer] [id number]    DISCUSSION     ________________________________________________________________    Scribe Disclosure:   I, Juan Redd, am serving as a scribe; to document services  "personally performed by Colton Liu -based on data collection and the provider's statements to me.   ________________________________________________________________    Consult Question: fever of unknown origin  Admission Diagnosis: fever   elevated billirubin  Fever         History of Present Illness:   Ms. Hebert is a 72-year-old female with an unremarkable PMH who was admitted to [facility] on 7/25 at the behest of her PCP who noted absolute lymphopenia on CBC. On 7/15 shortly after retuning from a hiking trip in [place] from 7/8-7/15, she experienced occipital HAs, increased fatigue, chills, and fevers (Tmax 102) that occured every 4-6 hours \"like clockwork.\" Moreover, she reported myalgias of the lower extremities bilaterally, neck, and shoulders. Of note, she visited [place] 6/9-6/23 where she removed ticks from others but denies getting bitten herself. She denies other insect bites, rash, sick contacts, and eating undercooked meats. Moreover, she denied any diarrhea, hematochezia, abdominal pain, cough, CP, SOB, jaw pain/fatigue, transient vision loss.  On 7/28, she stated that she did have some abdominal loose stool and nausea prior to the onset of fever.     Initial infectious work-up including Anaplasmosis, Ehrlichiosis, Babesiosis, EBV, CMV, HIV, West Nile Virus, HAV, HBV, HCV, acute Parvovirus B19 infection has been negative thus far. Moreover, recent CXR was negative and MRI of abdomen was negative for abscess and/or malignancy. Blood cultures thus far have shown no growth to date. Furthermore, she received Doxycycline from 7/20-7/26 without any resolution of her symptoms. History of intermittent rigors is suggestive of possible/probable intermittent bacteremia. An elevated CRP of 54 mg/L is trending downward (69->60->54 mg/L on cipro) and mildly elevated procalcitonin 0.64 ng/mL -> 0.56 -> 0.47 ng/mL is also improving. This pattern is consistent with a bacterial process and is not compatible with " viral etiology.     Subacute cholecystitis most likely at this time due to elevated LFTs and hyperbilirubinemia in conjunction with 7/26 MR-abdomen findings concerning for a subacute, non-obstructed ascending cholangitis. Physical exam is not consistent with acute cholescystitis; however, MRI findings are clearly abnormal (as are LFTs), thus targeting enteric sammi is reasonable. Salmonella could be a unifying diagnosis.    On 7/20, her PCP started Doxycycline for 10 days for presumed tick-borne illness. She was negative for Lyme, Anaplasma and Ehrlichia at the time. However, she was noted to have elevated LFTs (, , , Tbili 0.6) at the time. The following day, she visited the [facility] ED due to an episode of non-bilious, non-bloody emesis. In the ED, workup for Lyme, hep A, B, C, anaplasma, erlichia, CMV, and EBV were negative. An AB-US was performed which was unremarkable. Patient was discharged to home with recommendations to maintain hydration, continue ibuprofen for cyclical fevers, and continue doxycycline course. Because the patient felt worse, she returned to her PCP on 7/24, where she was noted to have elevated liver enzymes (, , alk phos 717, t.bili 2.5 and direct bili 2.0). Due to cyclic fevers with chills and sweats in addition to her elevated liver enzymes, she was admitted to [facility]. Of note, she denies having pets at home, living on or near a farm, taking any outpatient medications.    Since admission, the patient has been intermittently febrile (Tmax 100.7 on 7/25) and normotensive. CXR on 7/25 was negative for infiltrates, consolidation, acute processes. 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 7/26 HIDA  "scan showed no evidence of acute cholecystitis and poor washout of the radiotracer from the liver consistent with hepatocellular dysfunction.  RUQ US shows circumferential gallbladder wall thickening with cholelithiasis, and no evidence of acute cholecystitis based on earlier HIDA scan.       Blood culture X1 and parasite stain negative. Initial infectious work-up including Anaplasmosis, Ehrlichiosis, Babesiosis, EBV, CMV, HIV, West Nile Virus, HAV, HBV, HCV, acute Parvovirus B19 infection has been negative thus far. Moreover, the patient has been found to be negative for C. burnetti antibodies, F Tularensis antibodies, and Rickettsia antibodies. BETH was positive at a low titer: 1:40. The On  doxycycline was discontinued due to negative tickborne disease work-up and symptom persistence despite 7 days of therapy. On , IV ciprofloxacin was started. Tmax 102.5 this morning. CRP is trending down from 77 on admission to 54 today. Procalcitonin also trending downward 0.47 - 0.56 - 0.64. The patient's Tbili is trending up 4.4 - 3.6 - 3.1 - 2.8 (direct 3.5). ALT, AST, and ALP trending down.    This morning the patient reports feeling \"no different that yesterday, if anything worse.\" She reports chills and decreased PO intake: took a few bites of a hamburger last night. Moreover, she reports feeling like she \"cannot take a good breathe\" associated with \"a fullness in the chest.\" She denies vomiting, diarrhea, chest pain.     Temp (24hrs), Av.6  F (37.6  C), Min:97  F (36.1  C), Max:102.3  F (39.1  C)      Her WC is 6.8, CRP continue to trend downward 50 <- 54 - 60 - 69 - 77, Procalc also trending down 0.41 <- 0.47 - 0.56 - 0.64   Tbili is trending up 4.4 - 3.6 - 3.1 - 2.8 (direct 3.5). ALT, AST, and ALP trending down.               Review of Systems:   CONSTITUTIONAL:  Positive for fevers, chills, and diaphoresis  EYES: negative for icterus  ENT:  negative for hearing loss, tinnitus and sore " throat  RESPIRATORY:  negative for cough with sputum and dyspnea  CARDIOVASCULAR:  negative for chest pain, dyspnea  GASTROINTESTINAL:  Negative for nausea and vomiting (today). negative for diarrhea and constipation  GENITOURINARY:  negative for dysuria  HEME:  No easy bruising  INTEGUMENT:  negative for rash and pruritus  NEURO:  Negative for headache           Current Medications (antimicrobials listed in bold):       furosemide  20 mg Intravenous Once     potassium chloride  20 mEq Oral Once     piperacillin-tazobactam  3.375 g Intravenous Q6H     ciprofloxacin  400 mg Intravenous Q12H     sodium chloride (PF)  3 mL Intracatheter Q8H     sodium chloride (PF)  3 mL Intracatheter Q8H            Allergies:     Allergies   Allergen Reactions     Codeine Nausea and Vomiting            Physical Exam:   Vitals were reviewed  Patient Vitals for the past 24 hrs:   BP Temp Temp src Pulse Resp SpO2 Weight   07/29/17 0821 113/66 98.4  F (36.9  C) Oral 72 16 95 % 71.9 kg (158 lb 9.6 oz)   07/29/17 0500 - 98.6  F (37  C) Temporal - - - -   07/29/17 0411 97/59 97  F (36.1  C) Temporal 75 16 95 % -   07/29/17 0021 - 98.4  F (36.9  C) Temporal - - 95 % -   07/28/17 2338 - - - - - 96 % -   07/28/17 2332 - - - - - (!) 87 % -   07/28/17 2310 104/65 101.7  F (38.7  C) Temporal 84 16 92 % -   07/28/17 2155 - 102.3  F (39.1  C) Temporal - - - -   07/28/17 1941 - 100.2  F (37.9  C) Temporal - - - -   07/28/17 1907 114/65 100.3  F (37.9  C) Temporal 75 16 90 % -   07/28/17 1700 - - - - - 94 % -   07/28/17 1553 123/70 98.3  F (36.8  C) Temporal 86 16 96 % -   07/28/17 1433 111/71 102.2  F (39  C) Temporal 77 16 95 % -   07/28/17 1303 - 100.1  F (37.8  C) Temporal - - - -   07/28/17 1202 - 97.5  F (36.4  C) Axillary - - - -   07/28/17 1157 115/69 99.9  F (37.7  C) Temporal 81 16 92 % -     Ranges for his vital signs:  Temp:  [97  F (36.1  C)-102.3  F (39.1  C)] 98.4  F (36.9  C)  Pulse:  [72-86] 72  Resp:  [16] 16  BP: ()/(59-71)  113/66  SpO2:  [87 %-96 %] 95 %    Physical Examination:  GENERAL:  well-developed, well-nourished, in bed in no acute distress.  HEENT:  Head is normocephalic, atraumatic   EYES:  Eyes have mildly icteric sclerae without conjunctival injection or stigmata of endocarditis.    ENT:  Oropharynx is moist without exudates or ulcers. Tongue is midline  NECK:  Supple. No  Cervical lymphadenopathy  LUNGS:  Bilateral rales in the lung bases.   CARDIOVASCULAR:  Regular rate and rhythm with no murmurs, gallops or rubs.  ABDOMEN:  Normal bowel sounds, soft, nontender. No appreciable hepatosplenomegaly. Negative Cortés's sign   SKIN:  No acute rashes. No stigmata of endocarditis. Mildly jaundice.   NEUROLOGIC:  Grossly nonfocal. Active x4 extremities          Laboratory Data:     CRP Inflammation   Date Value Ref Range Status   07/29/2017 50.0 (H) 0.0 - 8.0 mg/L Final   07/28/2017 54.0 (H) 0.0 - 8.0 mg/L Final   07/27/2017 60.0 (H) 0.0 - 8.0 mg/L Final   07/26/2017 69.0 (H) 0.0 - 8.0 mg/L Final   07/25/2017 77.0 (H) 0.0 - 8.0 mg/L Final     Creatinine   Date Value Ref Range Status   07/29/2017 0.46 (L) 0.52 - 1.04 mg/dL Final   07/28/2017 0.43 (L) 0.52 - 1.04 mg/dL Final   07/27/2017 0.51 (L) 0.52 - 1.04 mg/dL Final   07/26/2017 0.53 0.52 - 1.04 mg/dL Final   07/25/2017 0.50 (L) 0.52 - 1.04 mg/dL Final     WBC   Date Value Ref Range Status   07/29/2017 6.1 4.0 - 11.0 10e9/L Final   07/28/2017 6.8 4.0 - 11.0 10e9/L Final   07/27/2017 6.4 4.0 - 11.0 10e9/L Final   07/25/2017 5.5 4.0 - 11.0 10e9/L Final   10/08/2014 4.1 4.0 - 11.0 10e9/L Final     Hemoglobin   Date Value Ref Range Status   07/29/2017 10.1 (L) 11.7 - 15.7 g/dL Final     MCV   Date Value Ref Range Status   07/29/2017 84 78 - 100 fl Final     Platelet Count   Date Value Ref Range Status   07/29/2017 437 150 - 450 10e9/L Final     Sed Rate   Date Value Ref Range Status   07/26/2017 27 0 - 30 mm/h Final     ALT   Date Value Ref Range Status   07/29/2017 166 (H) 0 - 50  U/L Final   07/28/2017 207 (H) 0 - 50 U/L Final   07/27/2017 262 (H) 0 - 50 U/L Final   07/26/2017 308 (H) 0 - 50 U/L Final   07/25/2017 350 (H) 0 - 50 U/L Final     AST   Date Value Ref Range Status   07/29/2017 47 (H) 0 - 45 U/L Final   07/28/2017 71 (H) 0 - 45 U/L Final   07/27/2017 116 (H) 0 - 45 U/L Final     Bilirubin Total   Date Value Ref Range Status   07/29/2017 4.9 (H) 0.2 - 1.3 mg/dL Final   07/28/2017 4.4 (H) 0.2 - 1.3 mg/dL Final   07/27/2017 3.6 (H) 0.2 - 1.3 mg/dL Final     No results found for: CMVPCR, CMQNT, CMVQ, HSVSP, HSVPC, EBVDN  Lab Results   Component Value Date     07/29/2017    BUN 6 (L) 07/29/2017       Attending Physician Attestation:  I have seen and evaluated Tameka Hebert. I have discussed with the house staff team or resident(s), and I agree with the above documented findings and plan in this note. I have reviewed today's vital signs, medications, labs and imaging.  If a medical student was involved in this note, they were serving in a capacity as a scribe.  Floor time: 30 minutes  Face-to-face time: 30 minutes  Total time: 60 minutes     Colton iLu MD MPH  [facility] Distinguished   Division of Infectious Diseases & International Medicine  Department of Medicine

## 2017-07-29 NOTE — PROGRESS NOTES
[facility] Internal Medicine Progress Note - Maroon 3    Tameka Hebert MRN# [id number]   Age: 72 year old YOB: 1945           Assessment and Plan:   Assessment:  Tameka Hebert is a 72 year old with no significant past medical history who presents with fevers, chills, and elevated liver enzymes c/w cholestatic hepatitis.     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 Fever  #. Acute hepatitis c/w cholestatic liver injury   Still experiencing chills and myalgias. Hemodynamically stable; does not meet SIRS or sepsis criteria currently. Workup at OSH negative for: lyme, anaplasma, ehrlichia, HIV, hepatitis panel, babesia, mononucleosis, [place], EBV/CMV. After MRI abdomen showing pericystic inflammation and little response to doxycycline, vector borne diseases are less likely and food borne illnesses like salmonella or a subacute cholangitis is more likely per ID. GI also on board and favor an infectious cause; do not think presentation/labs/imaging is consistent with acute cholecystitis. Rheumatologic causes like autoimmune hepatitis are less likely but labs pending.    - Continue ciprofloxacin per ID, plan for 14 day total course (07/26/17-08/08/17); added zosyn on 07/28/17    - D/C'd doxycycline on 07/26/17   - Trend CRP and procalcitonin levels, currently downtrending.   - IVD d/c'd 2/2 new crackles on lung exam and CXR with some mild pulmonary edema   - BETH 1:40. RF pending  - CMV and EBV serologies negative, PCR pending  - No anaplasma, babesia, or ehrlichia found on parasite  smear   - BCx negative to date   -   - Quant gold pending   - UA not suspicious for UTI   - MRCP, HIDA scan, RUQ US all negative for acute cholecystitis       #. Dyspnea w/ chest tightness likely 2/2 fluid overload    Hemodynamically stable. Chest pain not typical of ACS. No tachycardia, no LE edema and calves symmetric in diameter. Physical exam with bibasilar crackles. CXR w/ atelectasis/consolidation and increased small left pleural effusion. Likely 2/2 fluid overload.   - D/C'd IVF   - Monitor vs    - If tachycardia develops, new chest pain, increased dyspnea consider further workup for PE and/or ACS    FEN: IVF w/  cc/hr, full diet   PPX: No PPI indicated at this time, mechanical prophylaxis   CODE: FULL      Patient was discussed with staff attending, Dr. Cuello, who agrees with the above assessment & plan    Cee Partida MD/MPH  Internal Medicine/Dermatology   PGY-1  Pager: [phone]        Interval History:   Nursing notes reviewed and noted. Patient still had a couple of episodes of fevers and chills overnight. She is able to tolerate po intake. However, feeling some chest tightness w/ deep breaths and mild dyspnea this morning which is new for her. No more episodes of vomiting.      4 point ROS including Respiratory, CV, GI and , other than that noted in the HPI,  is negative          Medications:     Current Facility-Administered Medications   Medication     potassium chloride SA (K-DUR/KLOR-CON M) CR tablet 20-40 mEq     potassium chloride (KLOR-CON) Packet 20-40 mEq     potassium chloride 10 mEq in 100 mL intermittent infusion     potassium chloride 10 mEq in 100 mL intermittent infusion with 10 mg lidocaine     potassium chloride 20 mEq in 50 mL intermittent infusion     piperacillin-tazobactam (ZOSYN) 3.375 g vial to attach to  mL bag     hydrOXYzine (ATARAX) tablet 50 mg     acetaminophen (TYLENOL) tablet 650 mg     ciprofloxacin (CIPRO) infusion 400 mg     naloxone  "(NARCAN) injection 0.1-0.4 mg     sodium chloride (PF) 0.9% PF flush 3 mL     sodium chloride (PF) 0.9% PF flush 3 mL     lidocaine 1 % 1 mL     lidocaine (LMX4) kit     sodium chloride (PF) 0.9% PF flush 3 mL     sodium chloride (PF) 0.9% PF flush 3 mL     melatonin tablet 1 mg     ondansetron (ZOFRAN) injection 4 mg             Physical Exam:   Vitals were reviewed  Blood pressure 114/65, pulse 75, temperature 100.2  F (37.9  C), temperature source Temporal, resp. rate 16, height 1.8 m (5' 10.87\"), weight 72.6 kg (160 lb 1.6 oz), SpO2 90 %.    Intake/Output Summary (Last 24 hours) at 07/26/17 1519  Last data filed at 07/26/17 1125   Gross per 24 hour   Intake           2727.5 ml   Output              626 ml   Net           2101.5 ml     Vitals:    07/26/17 0854 07/27/17 0843 07/28/17 0830   Weight: 68.6 kg (151 lb 3.2 oz) 71.3 kg (157 lb 3 oz) 72.6 kg (160 lb 1.6 oz)       Physical Exam:  General: Alert, not in respiratory or painful distress  HEENT: anicteric sclera, PERRL, EOMI, mucous membranes dry, OP unobstructed, w/o erythema/discharge; no cervical LAD, FROM  CV: RRR, nl S1/S2, no S3/S4 appreciated, no m/r/g; intact & symmetric 3+ pulses (radial, DP)  Lungs: Bibasilar crackles. No use of accessory muscles. No wheezing or rhonchi.   Abd: Obese/full/flat, soft/tense, moves with respiration, BS+/-, not tender, not distended, no palpable organomegaly, no masses   Ext: WWP, no BLE edema. Calves equal in circumference and no focal areas of increased warmth.   Skin: no rashes, cyanosis, or jaundice  Neuro: AOx3, no focal neurologic deficits         Data:   ROUTINE LABS (Last four results)  CMP    Recent Labs  Lab 07/28/17  0342 07/27/17  0341 07/26/17  0419 07/25/17  1324    137 135 138   POTASSIUM 3.3* 3.7 3.7 3.5   CHLORIDE 106 104 104 104   CO2 23 22 20 26   ANIONGAP 9 10 11 8   * 102* 81 96   BUN 7 12 11 9   CR 0.43* 0.51* 0.53 0.50*   GFRESTIMATED >90Non  GFR Calc >90Non  " American GFR Calc >90Non  GFR Calc >90   GFRESTBLACK >90African American GFR Calc >90African American GFR Calc >90African American GFR Calc >90   ROSANA 7.5* 7.7* 7.9* 8.3*   PROTTOTAL 4.9* 5.0* 5.0* 5.7*   ALBUMIN 1.7* 1.8* 2.0* 2.3*   BILITOTAL 4.4* 3.6* 3.1* 2.8*   ALKPHOS 518* 537* 534* 594*   AST 71* 116* 145* 132*   * 262* 308* 350*     CBC    Recent Labs  Lab 07/28/17  0342 07/27/17  0341 07/25/17  1324   WBC 6.8 6.4 5.5   RBC 3.62* 3.43* 3.94   HGB 10.6* 10.0* 11.5*   HCT 30.7* 29.4* 34.5*   MCV 85 86 88   MCH 29.3 29.2 29.2   MCHC 34.5 34.0 33.3   RDW 14.8 15.0 14.5    331 244     INR    Recent Labs  Lab 07/27/17  0341   INR 1.12     Arterial Blood GasNo lab results found in last 7 days.  I&O's: I/O last 3 completed shifts:  In: 2703.75 [P.O.:1180; I.V.:1523.75]  Out: -     #. CXR 07/28/17    Impression:   1. Increased bibasilar atelectasis/consolidation, worrisome for  infection or edema.  2. Small left pleural effusion, increased.    #. MRI Abdomen 07/25/17   IMPRESSION:   1.  Cholelithiasis and moderate amount of pericholecystic inflammatory  changes and fluid. While no obstructing stone is identified,  correlation for symptoms of right upper quadrant pain and potentially  right upper quadrant ultrasound would be beneficial.  2. Mild hepatomegaly with elongated left lobe of the liver extending  up to the left upper quadrant. Scattered liver cysts. No solid focal  liver lesions.  3. 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 Recommend follow-up according to criteria described below in  the present report.    #. US Abdomen 07/27/17   Impression:   1.  Circumferential gallbladder wall thickening measuring up to 13 mm,  with cholelithiasis, overall similar to seen on MRI of 7/25/2017.  Nuclear medicine hepatobiliary scan performed yesterday demonstrated  no evidence of acute cholecystitis.  2.  Small right pleural effusion.  3.  Normal hepatic Doppler  study.    #. HIDA Scan 07/26/17   Impression:  1. No evidence of acute cholecystitis.  2. Poor washout of radiotracer from the liver, consistent with  hepatocellular dysfunction.          Physician Attestation  I, Vanessa Cuello MD, saw this patient with the resident and agree with the resident s findings and plan of care as documented in the resident s note with my edits.     I personally reviewed vital signs, medications, labs and imaging.    Vanessa Cuello MD  Date of Service (when I saw the patient): 7/28/17

## 2017-07-29 NOTE — PROGRESS NOTES
[facility], [place]    Internal Medicine Progress Note - [facility] Service    Main Plans for Today   1) continue IV ciprofloxacin and zosyn   2) monitor fluid status with strict I/O's  3) tylenol/ibuprofen PRN for fever management    Assessment & Plan      Tameka Hebert is an otherwise healthy 72 year old female admitted on 7/25/2017 with a 2 week onset of cyclical fevers, chills, and night sweats with associated nausea and vomiting and recent travel to [place] and [place].      #fevers with nausea and vomiting and cholestatic LFTs: Ddx infectious vs. Rheumatologic vs. Malignancy. Infectious etiology most likely considering her elevated CRP and procalcitonin levels (69 and 0.64). Working hypothesis is possible infection from salmonella typhi as it can colonize in the gall bladder and cause recurrent fevers. Not presenting with typical jessenia spots on abdomen. Per ID, unlikely rickettsial infection since her previously started course of doxycycline should have shown marked clinical improvement. Doxy d/c'ed 7/26.  Patient on IV ciprofloxacin and zosyn with improvement in procalcitnonin and CRP labs with continued elevation in bilirubin.  (Procalcitnonin 0.56 on 7/27, 0.47 on 7/28, 0.41 on 7/29; CRP: 60 on 7/27, 54 on 7/28, and 50 on 7/29). Patient continues to spike fevers though with longer intervals where she remains asymptomatic. LFTs trending down. Concern for a possible obstructive process causing infection unlikely given negative findings from HIDA scan and AB-US. GI on board as well though deferring to ID at the present. Rheumatologic/autoimmune etiology less likely with normal ESR, BETH, and F actin. Malignancy very unlikely given acute onset of symptoms and lack of weight loss but if infectious workup does not yield clinical improvement, can consider UPEP/SPEP.   1) monitor CBC and blood cultures, and trend CRP, and procalcitonin  2) await Rf, F-actin, Hep A, B, C, EBV, CMV, HSV  "PCRs  3) continue ciprofloxacin and add IV zosyn          #shortness of breath and chest tightness: patient complained of chest tightness and feeling \"winded\" on 7/28 in the AM. CXR and EKG ordered. EKG unremarkable and CXR showed some bibasilar pulmonary edema likely secondary to volume overload. Patient up 16 pounds since admission. NT-BNP elevated at 10,870. Elevated levels could be from some baseline diastolic dysfunction due to age, from elevated fluid levels stressing the heart, or possible acute CHF development. Low concern for new CHF diagnosis given her relatively asymptomatic presentation between periods of spiking a fever. Low concern for possible HCAP or PE acquired in hospital but on radar as possible complication.  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 Recommend follow-up: Less than 1 cm: Follow-up imaging in 6 months to 1 year, then lengthen interval to 2-3 years if no change.  -follow up in outpatient with GI               Diet: Room Service  Regular Diet Adult  Fluids: normal PO intake  DVT Prophylaxis: ambulate  Code Status: full code           Disposition Plan      Expected discharge: Pending resolution of fevers and appropriate PO intake without N and V, likely able to be discharged within 2-3 days.       Entered: Romero Lopez 07/28/2017, 6:45 PM      Information in the above section will display in the discharge planner report.        The patient's care was discussed with Frida Partida and [place].      Romero Lopez  [facility]: 3  Pager: [phone]  Please see sticky note for cross cover information          Interval History      Patient continued to spike fevers overnight and received both tylenol and ibuprofen for symptom " control. Feeling increased shortness of breath. BNP ordered and furosemide administered. Put on 2L of supplemental oxygen with O2 sats 95-98 overnight. This morning still complaining of shortness of breath. Obtained CXR.            Physical Exam  Vital Signs: Temp: 98.3  F (36.8  C) Temp src: Temporal BP: 123/70 Pulse: 86   Resp: 16 SpO2: 94 % O2 Device: None (Room air   General Appearance: Alert and oriented, conversant, lying in bed with blankets over her to keep her warm.   Respiratory: bibasilar inspiratory crackles, appropriate airflow in and out of lungs   Cardiovascular: normal S1 and S2, no r/m/g  GI: +BSx4, no RUQ tenderness to deep palpation, otherwise soft and nontender   Skin: no new rashes, bruises, or skin lesions appreciated  Extremities: some trace nonpitting edema.        Data   Medications        piperacillin-tazobactam  3.375 g Intravenous Q6H     ciprofloxacin  400 mg Intravenous Q12H     sodium chloride (PF)  3 mL Intracatheter Q8H     sodium chloride (PF)  3 mL Intracatheter Q8H     Data     Recent Labs  Lab 07/29/17  0410 07/28/17  1941 07/28/17  0342 07/27/17  0341  07/25/17  1324   WBC 6.1  --  6.8 6.4  --  5.5   HGB 10.1*  --  10.6* 10.0*  --  11.5*   MCV 84  --  85 86  --  88     --  380 331  --  244   INR  --   --   --  1.12  --   --      --  138 137  < > 138   POTASSIUM 3.1* 3.9 3.3* 3.7  < > 3.5   CHLORIDE 104  --  106 104  < > 104   CO2 25  --  23 22  < > 26   BUN 6*  --  7 12  < > 9   CR 0.46*  --  0.43* 0.51*  < > 0.50*   ANIONGAP 9  --  9 10  < > 8   ROSANA 7.5*  --  7.5* 7.7*  < > 8.3*   *  --  100* 102*  < > 96   ALBUMIN 1.6*  --  1.7* 1.8*  < > 2.3*   PROTTOTAL 4.9*  --  4.9* 5.0*  < > 5.7*   BILITOTAL 4.9*  --  4.4* 3.6*  < > 2.8*   ALKPHOS 489*  --  518* 537*  < > 594*   *  --  207* 262*  < > 350*   AST 47*  --  71* 116*  < > 132*   LIPASE  --   --   --   --   --  134   < > = values in this interval not displayed.  Recent Results (from the past 24  hour(s))   XR Chest Port 1 View    Narrative    EXAMINATION: XR CHEST PORT 1 VW, 7/29/2017 10:01 AM    COMPARISON: 7/28/2017    HISTORY: shortness of breath    FINDINGS: Cardiac silhouette is within normal limits. Small pleural  effusions and basilar opacities which have increased. Upper lungs  appear clear and unchanged.       Impression    IMPRESSION: Increased small bilateral pleural effusions with  associated basilar atelectasis/consolidation.    DEISI WALTER MD

## 2017-07-30 ENCOUNTER — APPOINTMENT (OUTPATIENT)
Dept: GENERAL RADIOLOGY | Facility: CLINIC | Age: 72
DRG: 372 | End: 2017-07-30
Attending: INTERNAL MEDICINE
Payer: MEDICARE

## 2017-07-30 ENCOUNTER — APPOINTMENT (OUTPATIENT)
Dept: CT IMAGING | Facility: CLINIC | Age: 72
DRG: 372 | End: 2017-07-30
Attending: INTERNAL MEDICINE
Payer: MEDICARE

## 2017-07-30 LAB
ALBUMIN SERPL-MCNC: 1.8 G/DL (ref 3.4–5)
ALP SERPL-CCNC: 475 U/L (ref 40–150)
ALT SERPL W P-5'-P-CCNC: 162 U/L (ref 0–50)
ANION GAP SERPL CALCULATED.3IONS-SCNC: 7 MMOL/L (ref 3–14)
AST SERPL W P-5'-P-CCNC: 77 U/L (ref 0–45)
BILIRUB SERPL-MCNC: 5.2 MG/DL (ref 0.2–1.3)
BUN SERPL-MCNC: 5 MG/DL (ref 7–30)
C DIFF TOX B STL QL: NORMAL
CALCIUM SERPL-MCNC: 7.9 MG/DL (ref 8.5–10.1)
CAMPYLOBACTER GROUP BY NAT: NOT DETECTED
CHLORIDE SERPL-SCNC: 104 MMOL/L (ref 94–109)
CO2 SERPL-SCNC: 26 MMOL/L (ref 20–32)
CREAT SERPL-MCNC: 0.49 MG/DL (ref 0.52–1.04)
CRP SERPL-MCNC: 41 MG/L (ref 0–8)
ENTERIC PATHOGEN COMMENT: NORMAL
ERYTHROCYTE [DISTWIDTH] IN BLOOD BY AUTOMATED COUNT: 14.7 % (ref 10–15)
GFR SERPL CREATININE-BSD FRML MDRD: ABNORMAL ML/MIN/1.7M2
GLUCOSE SERPL-MCNC: 109 MG/DL (ref 70–99)
HCT VFR BLD AUTO: 31.9 % (ref 35–47)
HGB BLD-MCNC: 11.1 G/DL (ref 11.7–15.7)
MAGNESIUM SERPL-MCNC: 1.8 MG/DL (ref 1.6–2.3)
MCH RBC QN AUTO: 29.4 PG (ref 26.5–33)
MCHC RBC AUTO-ENTMCNC: 34.8 G/DL (ref 31.5–36.5)
MCV RBC AUTO: 85 FL (ref 78–100)
NOROVIRUS I AND II BY NAT: NOT DETECTED
PLATELET # BLD AUTO: 473 10E9/L (ref 150–450)
POTASSIUM SERPL-SCNC: 3.5 MMOL/L (ref 3.4–5.3)
POTASSIUM SERPL-SCNC: 3.9 MMOL/L (ref 3.4–5.3)
POTASSIUM SERPL-SCNC: 4 MMOL/L (ref 3.4–5.3)
PROCALCITONIN SERPL-MCNC: 0.38 NG/ML
PROT SERPL-MCNC: 5.4 G/DL (ref 6.8–8.8)
RBC # BLD AUTO: 3.77 10E12/L (ref 3.8–5.2)
ROTAVIRUS A BY NAT: NOT DETECTED
SALMONELLA SPECIES BY NAT: NOT DETECTED
SHIGA TOXIN 1 GENE BY NAT: NOT DETECTED
SHIGA TOXIN 2 GENE BY NAT: NOT DETECTED
SHIGELLA SP+EIEC IPAH STL QL NAA+PROBE: NOT DETECTED
SODIUM SERPL-SCNC: 137 MMOL/L (ref 133–144)
SPECIMEN SOURCE: NORMAL
VIBRIO GROUP BY NAT: NOT DETECTED
WBC # BLD AUTO: 7.6 10E9/L (ref 4–11)
YERSINIA ENTEROCOLITICA BY NAT: NOT DETECTED

## 2017-07-30 PROCEDURE — 86140 C-REACTIVE PROTEIN: CPT | Performed by: STUDENT IN AN ORGANIZED HEALTH CARE EDUCATION/TRAINING PROGRAM

## 2017-07-30 PROCEDURE — 87040 BLOOD CULTURE FOR BACTERIA: CPT | Performed by: INTERNAL MEDICINE

## 2017-07-30 PROCEDURE — 25000128 H RX IP 250 OP 636: Performed by: INTERNAL MEDICINE

## 2017-07-30 PROCEDURE — 71010 XR CHEST PORT 1 VW: CPT

## 2017-07-30 PROCEDURE — 36415 COLL VENOUS BLD VENIPUNCTURE: CPT | Performed by: INTERNAL MEDICINE

## 2017-07-30 PROCEDURE — 20000002 ZZH R&B BMT INTERMEDIATE

## 2017-07-30 PROCEDURE — 36415 COLL VENOUS BLD VENIPUNCTURE: CPT | Performed by: STUDENT IN AN ORGANIZED HEALTH CARE EDUCATION/TRAINING PROGRAM

## 2017-07-30 PROCEDURE — 25000132 ZZH RX MED GY IP 250 OP 250 PS 637: Mod: GY | Performed by: INTERNAL MEDICINE

## 2017-07-30 PROCEDURE — 85027 COMPLETE CBC AUTOMATED: CPT | Performed by: STUDENT IN AN ORGANIZED HEALTH CARE EDUCATION/TRAINING PROGRAM

## 2017-07-30 PROCEDURE — 82247 BILIRUBIN TOTAL: CPT | Performed by: STUDENT IN AN ORGANIZED HEALTH CARE EDUCATION/TRAINING PROGRAM

## 2017-07-30 PROCEDURE — 25000128 H RX IP 250 OP 636: Performed by: STUDENT IN AN ORGANIZED HEALTH CARE EDUCATION/TRAINING PROGRAM

## 2017-07-30 PROCEDURE — 83735 ASSAY OF MAGNESIUM: CPT | Performed by: INTERNAL MEDICINE

## 2017-07-30 PROCEDURE — A9270 NON-COVERED ITEM OR SERVICE: HCPCS | Mod: GY | Performed by: INTERNAL MEDICINE

## 2017-07-30 PROCEDURE — 25000132 ZZH RX MED GY IP 250 OP 250 PS 637: Mod: GY | Performed by: STUDENT IN AN ORGANIZED HEALTH CARE EDUCATION/TRAINING PROGRAM

## 2017-07-30 PROCEDURE — 84132 ASSAY OF SERUM POTASSIUM: CPT | Performed by: INTERNAL MEDICINE

## 2017-07-30 PROCEDURE — 87493 C DIFF AMPLIFIED PROBE: CPT | Performed by: INTERNAL MEDICINE

## 2017-07-30 PROCEDURE — 80053 COMPREHEN METABOLIC PANEL: CPT | Performed by: STUDENT IN AN ORGANIZED HEALTH CARE EDUCATION/TRAINING PROGRAM

## 2017-07-30 PROCEDURE — 74177 CT ABD & PELVIS W/CONTRAST: CPT

## 2017-07-30 PROCEDURE — 87086 URINE CULTURE/COLONY COUNT: CPT | Performed by: INTERNAL MEDICINE

## 2017-07-30 PROCEDURE — A9270 NON-COVERED ITEM OR SERVICE: HCPCS | Mod: GY | Performed by: STUDENT IN AN ORGANIZED HEALTH CARE EDUCATION/TRAINING PROGRAM

## 2017-07-30 PROCEDURE — 84145 PROCALCITONIN (PCT): CPT | Performed by: STUDENT IN AN ORGANIZED HEALTH CARE EDUCATION/TRAINING PROGRAM

## 2017-07-30 PROCEDURE — 87506 IADNA-DNA/RNA PROBE TQ 6-11: CPT | Performed by: INTERNAL MEDICINE

## 2017-07-30 PROCEDURE — 99233 SBSQ HOSP IP/OBS HIGH 50: CPT | Mod: GC | Performed by: INTERNAL MEDICINE

## 2017-07-30 RX ORDER — FUROSEMIDE 10 MG/ML
20 INJECTION INTRAMUSCULAR; INTRAVENOUS ONCE
Status: COMPLETED | OUTPATIENT
Start: 2017-07-30 | End: 2017-07-30

## 2017-07-30 RX ORDER — IOPAMIDOL 755 MG/ML
95 INJECTION, SOLUTION INTRAVASCULAR ONCE
Status: COMPLETED | OUTPATIENT
Start: 2017-07-30 | End: 2017-07-30

## 2017-07-30 RX ORDER — POTASSIUM CHLORIDE 750 MG/1
20 TABLET, EXTENDED RELEASE ORAL ONCE
Status: COMPLETED | OUTPATIENT
Start: 2017-07-30 | End: 2017-07-30

## 2017-07-30 RX ORDER — URSODIOL 250 MG/1
250 TABLET, FILM COATED ORAL 2 TIMES DAILY
Status: DISCONTINUED | OUTPATIENT
Start: 2017-07-30 | End: 2017-07-31 | Stop reason: HOSPADM

## 2017-07-30 RX ADMIN — PIPERACILLIN AND TAZOBACTAM 3.38 G: 3; .375 INJECTION, POWDER, LYOPHILIZED, FOR SOLUTION INTRAVENOUS; PARENTERAL at 22:02

## 2017-07-30 RX ADMIN — CIPROFLOXACIN 400 MG: 2 INJECTION, SOLUTION INTRAVENOUS at 11:14

## 2017-07-30 RX ADMIN — ACETAMINOPHEN 650 MG: 325 TABLET, FILM COATED ORAL at 12:29

## 2017-07-30 RX ADMIN — ACETAMINOPHEN 650 MG: 325 TABLET, FILM COATED ORAL at 00:38

## 2017-07-30 RX ADMIN — PIPERACILLIN AND TAZOBACTAM 3.38 G: 3; .375 INJECTION, POWDER, LYOPHILIZED, FOR SOLUTION INTRAVENOUS; PARENTERAL at 04:35

## 2017-07-30 RX ADMIN — URSODIOL 250 MG: 250 TABLET, FILM COATED ORAL at 22:01

## 2017-07-30 RX ADMIN — HYDROXYZINE HYDROCHLORIDE 50 MG: 25 TABLET ORAL at 22:03

## 2017-07-30 RX ADMIN — URSODIOL 250 MG: 250 TABLET, FILM COATED ORAL at 13:02

## 2017-07-30 RX ADMIN — CIPROFLOXACIN 400 MG: 2 INJECTION, SOLUTION INTRAVENOUS at 00:14

## 2017-07-30 RX ADMIN — POTASSIUM CHLORIDE 20 MEQ: 750 TABLET, EXTENDED RELEASE ORAL at 13:02

## 2017-07-30 RX ADMIN — PIPERACILLIN AND TAZOBACTAM 3.38 G: 3; .375 INJECTION, POWDER, LYOPHILIZED, FOR SOLUTION INTRAVENOUS; PARENTERAL at 10:00

## 2017-07-30 RX ADMIN — FUROSEMIDE 20 MG: 10 INJECTION, SOLUTION INTRAVENOUS at 13:02

## 2017-07-30 RX ADMIN — IOPAMIDOL 95 ML: 755 INJECTION, SOLUTION INTRAVENOUS at 16:13

## 2017-07-30 RX ADMIN — PIPERACILLIN AND TAZOBACTAM 3.38 G: 3; .375 INJECTION, POWDER, LYOPHILIZED, FOR SOLUTION INTRAVENOUS; PARENTERAL at 17:02

## 2017-07-30 NOTE — PLAN OF CARE
Problem: Individualization  Goal: Patient Preferences  Outcome: No Change  Patient continues with intermittent fevers, spiked around 1200 to 101 temporal. Given tylenol with good results. Awaiting CT to send transport for abd/pelvic CT. Patient frustrated will not improving faster, support her and family.

## 2017-07-30 NOTE — PROGRESS NOTES
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     Plan     Today:    - CT AP with contrast per Hepatology Recs to eval for fluid collection. Low concern however.   - Continue diuresis with 20 mg IV lasix. Recheck K and MG this PM and replete per protocol  - Continue tylenol 2g limit qday prn for fevers   - Strict I/O's  - Patient with further costochondritis today. Does not want anything for that pain at this time.   - Start ursodiol BID for cholestasis  - 1 episode of loose stool PRIOR TO ursodiol dosing. C diff testing ordered but low suspicion as well  - Continue IV ciprofloxacin (started 07/26/17); per ID added IV zosyn 07/28/17. Plan to go home on po ciprofloxacin pending respiratory status and fevers  - Brucella and Bartonella testing added for AM lab draw     #. 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 Fever  #. Acute hepatitis c/w cholestatic picture  Still experiencing chills and myalgias. LFTs improving but with continued hyperbilirubinemia. Workup at OSH negative for: lyme, anaplasma, ehrlichia, HIV, hepatitis panel, babesia, mononucleosis, [place], EBV/CMV. No suspicion for acute cholecystitis from obstruction 2/2 negative imaging and no RUQ pain. Rheumatologic causes like autoimmune hepatitis are less likely but labs pending.   - Continue ciprofloxacin per ID, plan for 14 day total course (07/26/17-08/08/17); added zosyn on 07/28/17    - Consider ursodiol to aid bilirubin flow   - D/C'd doxycycline on 07/26/17   - IVF d/c'd 2/2 new crackles on lung exam and CXR with some mild pulmonary edema   - BETH 1:40. RF pending  - CRP elevated but ESR wnl. CRP and procalcitonin slowly  "trending down  - CK wnl  - CMV, EBV, HSV negative   - No anaplasma, babesia, or ehrlichia found on parasite smear   - BCx negative to date   - UA not suspicious for UTI   - MRCP, HIDA scan, RUQ US all negative for acute calculous cholecystitis      #. Dyspnea w/ chest tightness likely 2/2 fluid overload    Hemodynamically stable but crackles advanced to mid-lung fields. Chest pain not typical of ACS. No tachycardia, no LE edema and calves symmetric in diameter. CXR 07/28/17 w/ atelectasis/consolidation and increased small left pleural effusion. Likely 2/2 fluid overload.. BNP 10,807. Diuresing well    - Strict I/O's   - D/C'd IVF   - Consider echocardiogram in future   - If tachycardia develops, new chest pain, increased dyspnea consider further workup for PE and/or ACS    FEN: no mIVF,  full diet   PPX: No PPI indicated at this time, mechanical prophylaxis   CODE: FULL      Patient was discussed with staff attending, Dr. Cuello, who agrees with the above assessment & plan    Jatin Mullins MD  Internal Medicine PGY-2  [phone]        Interval History:   Nursing notes reviewed and noted. Patient spiked fever overnight to 101.8. Feels miserable during these times. Did have one loose stool today that was \"foul smelling.\" Feels a little bit dejected that she has not improved. Being seen by ID and hepatology attendings today.     4 point ROS including Respiratory, CV, GI and , other than that noted in the HPI,  is negative          Medications:     Current Facility-Administered Medications   Medication     ursodiol (ACTIGALL) tablet 250 mg     sodium chloride (PF) 0.9% PF flush 74 mL     iopamidol (ISOVUE-370) solution 95 mL     potassium chloride SA (K-DUR/KLOR-CON M) CR tablet 20-40 mEq     potassium chloride (KLOR-CON) Packet 20-40 mEq     potassium chloride 10 mEq in 100 mL intermittent infusion     potassium chloride 10 mEq in 100 mL intermittent infusion with 10 mg lidocaine     potassium chloride 20 " "mEq in 50 mL intermittent infusion     piperacillin-tazobactam (ZOSYN) 3.375 g vial to attach to  mL bag     hydrOXYzine (ATARAX) tablet 50 mg     acetaminophen (TYLENOL) tablet 650 mg     ciprofloxacin (CIPRO) infusion 400 mg     naloxone (NARCAN) injection 0.1-0.4 mg     lidocaine 1 % 1 mL     lidocaine (LMX4) kit     sodium chloride (PF) 0.9% PF flush 3 mL     sodium chloride (PF) 0.9% PF flush 3 mL     melatonin tablet 1 mg     ondansetron (ZOFRAN) injection 4 mg             Physical Exam:   Vitals were reviewed  Blood pressure 116/74, pulse 84, temperature 98.6  F (37  C), temperature source Temporal, resp. rate 18, height 1.8 m (5' 10.87\"), weight 70.4 kg (155 lb 3.2 oz), SpO2 93 %.      Intake/Output Summary (Last 24 hours) at 07/30/17 1557  Last data filed at 07/30/17 1536   Gross per 24 hour   Intake             1470 ml   Output             5450 ml   Net            -3980 ml         Wt Readings from Last 5 Encounters:   07/30/17 70.4 kg (155 lb 3.2 oz)       Physical Exam:  General: Alert, not in respiratory or painful distress  HEENT: anicteric sclera, PERRL, EOMI, mucous membranes dry, OP unobstructed, w/o erythema/discharge; no cervical LAD, FROM  CV: RRR, nl S1/S2, no S3/S4 appreciated, no m/r/g; intact & symmetric 3+ pulses (radial, DP)  Lungs: Bibasilar crackles. No use of accessory muscles. No wheezing or rhonchi.   Abd: Obese/full/flat, soft/tense, moves with respiration, BS+/-, not tender, not distended, no palpable organomegaly, no masses   Ext: WWP, no BLE edema. Calves equal in circumference and no focal areas of increased warmth.   Skin: no rashes, cyanosis, or jaundice  Neuro: AOx3, no focal neurologic deficits         Data:   ROUTINE LABS (Last four results)  CMP    Recent Labs  Lab 07/30/17  0508 07/30/17  0051 07/29/17  1712 07/29/17  0410  07/28/17  0342 07/27/17  0341     --   --  139  --  138 137   POTASSIUM 3.9 4.0 3.3* 3.1*  < > 3.3* 3.7   CHLORIDE 104  --   --  104  --  106 " 104   CO2 26  --   --  25  --  23 22   ANIONGAP 7  --   --  9  --  9 10   *  --   --  110*  --  100* 102*   BUN 5*  --   --  6*  --  7 12   CR 0.49*  --   --  0.46*  --  0.43* 0.51*   GFRESTIMATED >90Non  GFR Calc  --   --  >90Non  GFR Calc  --  >90Non  GFR Calc >90Non  GFR Calc   GFRESTBLACK >90African American GFR Calc  --   --  >90African American GFR Calc  --  >90African American GFR Calc >90African American GFR Calc   ROSANA 7.9*  --   --  7.5*  --  7.5* 7.7*   MAG  --   --  1.7  --   --   --   --    PROTTOTAL 5.4*  --   --  4.9*  --  4.9* 5.0*   ALBUMIN 1.8*  --   --  1.6*  --  1.7* 1.8*   BILITOTAL 5.2*  --   --  4.9*  --  4.4* 3.6*   ALKPHOS 475*  --   --  489*  --  518* 537*   AST 77*  --   --  47*  --  71* 116*   *  --   --  166*  --  207* 262*   < > = values in this interval not displayed.  CBC    Recent Labs  Lab 07/30/17  0508 07/29/17  0410 07/28/17  0342 07/27/17  0341   WBC 7.6 6.1 6.8 6.4   RBC 3.77* 3.46* 3.62* 3.43*   HGB 11.1* 10.1* 10.6* 10.0*   HCT 31.9* 29.2* 30.7* 29.4*   MCV 85 84 85 86   MCH 29.4 29.2 29.3 29.2   MCHC 34.8 34.6 34.5 34.0   RDW 14.7 14.9 14.8 15.0   * 437 380 331     INR    Recent Labs  Lab 07/27/17  0341   INR 1.12     Arterial Blood GasNo lab results found in last 7 days.  I&O's: I/O last 3 completed shifts:  In: 1480 [P.O.:700; I.V.:780]  Out: 4650 [Urine:4650]    #. CXR 07/29/17   IMPRESSION: Increased small bilateral pleural effusions with  associated basilar atelectasis/consolidation.    #. CXR 07/28/17    Impression:   1. Increased bibasilar atelectasis/consolidation, worrisome for  infection or edema.  2. Small left pleural effusion, increased.    #. MRI Abdomen 07/25/17   IMPRESSION:   1.  Cholelithiasis and moderate amount of pericholecystic inflammatory  changes and fluid. While no obstructing stone is identified,  correlation for symptoms of right upper quadrant pain and  potentially  right upper quadrant ultrasound would be beneficial.  2. Mild hepatomegaly with elongated left lobe of the liver extending  up to the left upper quadrant. Scattered liver cysts. No solid focal  liver lesions.  3. 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 Recommend follow-up according to criteria described below in  the present report.    #. US Abdomen 07/27/17   Impression:   1.  Circumferential gallbladder wall thickening measuring up to 13 mm,  with cholelithiasis, overall similar to seen on MRI of 7/25/2017.  Nuclear medicine hepatobiliary scan performed yesterday demonstrated  no evidence of acute cholecystitis.  2.  Small right pleural effusion.  3.  Normal hepatic Doppler study.    #. HIDA Scan 07/26/17   Impression:  1. No evidence of acute cholecystitis.  2. Poor washout of radiotracer from the liver, consistent with  hepatocellular dysfunction.      Physician Attestation  I, Vanessa Cuello MD, saw this patient with the resident and agree with the resident s findings and plan of care as documented in the resident s note with my edits.     I personally reviewed vital signs, medications, labs and imaging.    Vanessa Cuello MD  Date of Service (when I saw the patient): 07/30/17

## 2017-07-30 NOTE — PLAN OF CARE
"Problem: Goal Outcome Summary  Goal: Goal Outcome Summary  Outcome: Improving  Afebrile, OVSS on RA. A&Ox4, \"fatigued\". Up ind. Continues to c/o discomfort in chest, same in quality as earlier in day- waxing and waning. Intermittent dyspnea, primarily on exertion. Appetite fair. Denies N/V/D/C. Reports BM earlier. Urinating spont.  K recheck @ 3.3, given 60 mEq PO per MAR and recheck ordered for 0030. Atarax @ HS per pt request. Will continue to monitor per POC.        Problem: Infection, Risk/Actual (Adult)  Goal: Identify Related Risk Factors and Signs and Symptoms  Related risk factors and signs and symptoms are identified upon initiation of Human Response Clinical Practice Guideline (CPG)   Outcome: No Change    07/29/17 2151   Infection, Risk/Actual   Infection, Risk/Actual: Related Risk Factors exposure to microbes   Signs and Symptoms (Infection, Risk/Actual) pain  (fatigue)       Goal: Infection Prevention/Resolution  Patient will demonstrate the desired outcomes by discharge/transition of care.   Outcome: No Change    07/29/17 2151   Infection, Risk/Actual (Adult)   Infection Prevention/Resolution unable to achieve outcome           "

## 2017-07-30 NOTE — PROGRESS NOTES
[facility], [place]   General Infectious Disease Progress Note     Patient:  Tameka Hebert, Date of birth 1945, Medical record number [id number]  Date of Visit:  July 25, 2017  Date of Admission: 7/25/2017  Consult Requester:Vanessa Cuello MD            Assessment and Recommendations:   ASSESSMENT:  1. Systemic Febrile illness with subacute ascending cholangitis / cholecystitis.  -- non-typhoidal salmonella infection would be a possible unifying diagnosis, and one does not need diarrhea to have salmonellosis.  She did have initial abdominal prodrome prior to systemic fevers.  -- differential diagnosis would include other enteric pathogens (e.g. Listeria) but salmonella causes the most biliary pathology.  Piperacillin would cover Listeria (as well as FQ-resistant salmonella).  -- This is obviously not acute cholecystitis with obstruction based on the clinical presentation and physical examination.   -- the fever, rigors, elevated procalcitonin and elevated CRP (which are now trending with improvement on antibiotics = bacterial process)   Viral infections don't elevated procalcitonin or CRP.  -- Still febrile. This is not unexpected. Historically antibiotics do not change the clinical duration of salmonella disease.  CRP and procalcitonin are downtrending, and this downtrending of the inflammatory bacterial markers coupled with downtrending of most of the LFTs is an optimistic finding that therapeutic things are on the correct trajectory.  That trajectory is slower than one would like.     2. Cholestasis -- expect this to lag.  Other hepatic enzymes have marked improved since OSH ER visit on 7/24.    3. Hematologic abnormalities. Initial lymphopenia and thrombocytopenia noted at OSH now resolving. Currently normocytic, normochromic anemia.  Leukopenia can be seen with Salmonella    4. Outdoor exposures unlikely related. Travel history (i.e. eating in restaurants) is likely  "related.      RECOMMENDATION:  1. Continue current plan. Expect her to slowly improve.  2. Could consider ursodiol to attempt to counter cholestasis.   3. Eventually medication will be ciprofloxacin 500mg PO BID x14 days in total. Could be switched at any time.  4. ID wouldn't repeat abdominal imaging.  She does not have biliary obstruction or a surgical abdomen.  A hepatic abscess could cause persistent fever, but in that case her CRP would not be downtrending.    5. FYI, The [facility] lab won't accept formed stool for processing (which is why ID hasn't recommended stool studies). If she has non-formed stool/diarrhea, please send for stool studies.     Colton Liu [phone]    Consult Question: fever of unknown origin  Admission Diagnosis: fever   elevated billirubin  Fever         History of Present Illness:     Ms. Hebert is a 72-year-old female with an unremarkable PMH who was admitted to [facility] on 7/25 at the behest of her PCP who noted absolute lymphopenia on CBC. On 7/15 shortly after retuning from a hiking trip in [place] from 7/8-7/15, she experienced occipital HAs, increased fatigue, chills, and fevers (Tmax 102) that occured every 4-6 hours \"like clockwork.\" Moreover, she reported myalgias of the lower extremities bilaterally, neck, and shoulders. Of note, she visited [place] 6/9-6/23 where she removed ticks from others but denies getting bitten herself. She denies other insect bites, rash, sick contacts, and eating undercooked meats. Moreover, she denied any diarrhea, hematochezia, abdominal pain, cough, CP, SOB, jaw pain/fatigue, transient vision loss.  On 7/28, she stated that she did have some abdominal loose stool and nausea prior to the onset of fever.     Initial infectious work-up including Anaplasmosis, Ehrlichiosis, Babesiosis, EBV, CMV, HIV, West Nile Virus, HAV, HBV, HCV, acute Parvovirus B19 infection has been negative thus far. Moreover, recent CXR was negative and MRI of abdomen " was negative for abscess and/or malignancy. Blood cultures thus far have shown no growth to date. Furthermore, she received Doxycycline from 7/20-7/26 without any resolution of her symptoms. History of intermittent rigors is suggestive of possible/probable intermittent bacteremia. An elevated CRP of 54 mg/L is trending downward (69->60->54 mg/L on cipro) and mildly elevated procalcitonin 0.64 ng/mL -> 0.56 -> 0.47 ng/mL is also improving. This pattern is consistent with a bacterial process and is not compatible with viral etiology.     Subacute cholecystitis most likely at this time due to elevated LFTs and hyperbilirubinemia in conjunction with 7/26 MR-abdomen findings concerning for a subacute, non-obstructed ascending cholangitis. Physical exam is not consistent with acute cholescystitis; however, MRI findings are clearly abnormal (as are LFTs), thus targeting enteric sammi is reasonable. Salmonella could be a unifying diagnosis.    On 7/20, her PCP started Doxycycline for 10 days for presumed tick-borne illness. She was negative for Lyme, Anaplasma and Ehrlichia at the time. However, she was noted to have elevated LFTs (, , , Tbili 0.6) at the time. The following day, she visited the [facility] due to an episode of non-bilious, non-bloody emesis. In the ED, workup for Lyme, hep A, B, C, anaplasma, erlichia, CMV, and EBV were negative. An AB-US was performed which was unremarkable. Patient was discharged to home with recommendations to maintain hydration, continue ibuprofen for cyclical fevers, and continue doxycycline course. Because the patient felt worse, she returned to her PCP on 7/24, where she was noted to have elevated liver enzymes (, , alk phos 717, t.bili 2.5 and direct bili 2.0). Due to cyclic fevers with chills and sweats in addition to her elevated liver enzymes, she was admitted to [facility]. Of note, she denies having pets at home, living on or near a farm, taking  "any outpatient medications.    Since admission, the patient has been intermittently febrile (Tmax 100.7 on 7/25) and normotensive. CXR on 7/25 was negative for infiltrates, consolidation, acute processes. 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 7/26 HIDA scan showed no evidence of acute cholecystitis and poor washout of the radiotracer from the liver consistent with hepatocellular dysfunction. 7/27 RUQ US shows circumferential gallbladder wall thickening with cholelithiasis, and no evidence of acute cholecystitis based on earlier HIDA scan.      7/26 Blood culture X1 and parasite stain negative. Initial infectious work-up including Anaplasmosis, Ehrlichiosis, Babesiosis, EBV, CMV, HIV, West Nile Virus, HAV, HBV, HCV, acute Parvovirus B19 infection has been negative thus far. Moreover, the patient has been found to be negative for C. burnetti antibodies, F Tularensis antibodies, and Rickettsia antibodies. BETH was positive at a low titer: 1:40. The On 7/26 doxycycline was discontinued due to negative tickborne disease work-up and symptom persistence despite 7 days of therapy. On 7/26, IV ciprofloxacin was started. Tmax 102.5 this morning. CRP is trending down from 77 on admission to 54 today. Procalcitonin also trending downward 0.47 - 0.56 - 0.64. The patient's Tbili is trending up 4.4 - 3.6 - 3.1 - 2.8 (direct 3.5). ALT, AST, and ALP trending down.    This morning the patient reports feeling \"no different that yesterday, if anything worse.\" She reports chills and decreased PO intake: took a few bites of a hamburger last night. Moreover, she reports feeling like she \"cannot take a good breathe\" associated with \"a fullness in the chest.\" She denies " vomiting, diarrhea, chest pain.     Temp (24hrs), Av.6  F (37.6  C), Min:97  F (36.1  C), Max:102.3  F (39.1  C)      Her WC is 6.8, CRP continue to trend downward 41 <- 50 <- 54 - 60 - 69 - 77, Procalc also trending down 0.38 <- 0.41 <- 0.47 - 0.56 - 0.64   Tbili is trending up 4.4 - 3.6 - 3.1 - 2.8 (direct 3.5). ALT, AST, and ALP trending down. (AST is up from 47 (almost normal) to 77 IU/mL on ).               Review of Systems:   CONSTITUTIONAL:  Positive for fevers, chills, and diaphoresis  EYES: negative for icterus  ENT:  negative for hearing loss, tinnitus and sore throat  RESPIRATORY:  negative for cough with sputum and dyspnea  CARDIOVASCULAR:  negative for chest pain, dyspnea  GASTROINTESTINAL:  Negative for nausea and vomiting (today). negative for diarrhea and constipation. Appetite remains poor.  GENITOURINARY:  negative for dysuria  HEME:  No easy bruising  INTEGUMENT:  negative for rash and pruritus  NEURO:  Negative for headache           Current Medications (antimicrobials listed in bold):       ursodiol  250 mg Oral BID     piperacillin-tazobactam  3.375 g Intravenous Q6H     ciprofloxacin  400 mg Intravenous Q12H     sodium chloride (PF)  3 mL Intracatheter Q8H            Allergies:     Allergies   Allergen Reactions     Codeine Nausea and Vomiting            Physical Exam:   Vitals were reviewed  Patient Vitals for the past 24 hrs:   BP Temp Temp src Pulse Heart Rate Resp SpO2 Weight   17 1329 116/74 99.7  F (37.6  C) Temporal 84 84 18 90 % -   17 1226 - 101  F (38.3  C) Temporal - - - 92 % -   17 0738 122/70 97.5  F (36.4  C) Axillary 74 74 18 92 % 70.4 kg (155 lb 3.2 oz)   17 0435 120/71 97  F (36.1  C) Axillary 78 - 18 95 % -   17 0104 - 100.7  F (38.2  C) Axillary 99 - - 95 % -   17 0103 - - - - - - 90 % -   17 0000 132/76 101.8  F (38.8  C) Axillary - 101 18 91 % -   173 124/77 97.6  F (36.4  C) Axillary 84 - 18 95 % -   17  1559 119/73 98.2  F (36.8  C) Axillary 78 - 16 92 % -   07/29/17 1430 - 98.6  F (37  C) Oral - - - - -     Ranges for his vital signs:  Temp:  [97  F (36.1  C)-101.8  F (38.8  C)] 99.7  F (37.6  C)  Pulse:  [74-99] 84  Heart Rate:  [] 84  Resp:  [16-18] 18  BP: (116-132)/(70-77) 116/74  SpO2:  [90 %-95 %] 90 %    Physical Examination:  GENERAL:  well-developed, well-nourished, in bed in no acute distress.  HEENT:  Head is normocephalic, atraumatic   EYES:  Eyes have mildly icteric sclerae without conjunctival injection or stigmata of endocarditis.    SKIN:  No acute rashes. No stigmata of endocarditis.  jaundice.   NEUROLOGIC:  Ambulatory with normal gait.         Laboratory Data:     CRP Inflammation   Date Value Ref Range Status   07/30/2017 41.0 (H) 0.0 - 8.0 mg/L Final   07/29/2017 50.0 (H) 0.0 - 8.0 mg/L Final   07/28/2017 54.0 (H) 0.0 - 8.0 mg/L Final   07/27/2017 60.0 (H) 0.0 - 8.0 mg/L Final   07/26/2017 69.0 (H) 0.0 - 8.0 mg/L Final     Creatinine   Date Value Ref Range Status   07/30/2017 0.49 (L) 0.52 - 1.04 mg/dL Final   07/29/2017 0.46 (L) 0.52 - 1.04 mg/dL Final   07/28/2017 0.43 (L) 0.52 - 1.04 mg/dL Final   07/27/2017 0.51 (L) 0.52 - 1.04 mg/dL Final   07/26/2017 0.53 0.52 - 1.04 mg/dL Final     WBC   Date Value Ref Range Status   07/30/2017 7.6 4.0 - 11.0 10e9/L Final   07/29/2017 6.1 4.0 - 11.0 10e9/L Final   07/28/2017 6.8 4.0 - 11.0 10e9/L Final   07/27/2017 6.4 4.0 - 11.0 10e9/L Final   07/25/2017 5.5 4.0 - 11.0 10e9/L Final     Hemoglobin   Date Value Ref Range Status   07/30/2017 11.1 (L) 11.7 - 15.7 g/dL Final     MCV   Date Value Ref Range Status   07/30/2017 85 78 - 100 fl Final     Platelet Count   Date Value Ref Range Status   07/30/2017 473 (H) 150 - 450 10e9/L Final     Sed Rate   Date Value Ref Range Status   07/26/2017 27 0 - 30 mm/h Final     ALT   Date Value Ref Range Status   07/30/2017 162 (H) 0 - 50 U/L Final   07/29/2017 166 (H) 0 - 50 U/L Final   07/28/2017 207 (H) 0 - 50  U/L Final   07/27/2017 262 (H) 0 - 50 U/L Final   07/26/2017 308 (H) 0 - 50 U/L Final     AST   Date Value Ref Range Status   07/30/2017 77 (H) 0 - 45 U/L Final   07/29/2017 47 (H) 0 - 45 U/L Final   07/28/2017 71 (H) 0 - 45 U/L Final     Bilirubin Total   Date Value Ref Range Status   07/30/2017 5.2 (H) 0.2 - 1.3 mg/dL Final   07/29/2017 4.9 (H) 0.2 - 1.3 mg/dL Final   07/28/2017 4.4 (H) 0.2 - 1.3 mg/dL Final     No results found for: CMVPCR, CMQNT, CMVQ, HSVSP, HSVPC, EBVDN  Lab Results   Component Value Date     07/30/2017    BUN 5 (L) 07/30/2017       Attending Physician Attestation:  I have seen and evaluated Tameka Hebert. I have discussed with the house staff team or resident(s), and I agree with the above documented findings and plan in this note. I have reviewed today's vital signs, medications, labs and imaging.  If a medical student was involved in this note, they were serving in a capacity as a scribe.  Floor time: 30 minutes  Face-to-face time: 30 minutes  Total time: 60 minutes     Colton Liu MD MPH  [facility]   Division of Infectious Diseases & International Medicine  Department of Medicine

## 2017-07-30 NOTE — PROGRESS NOTES
Saw patient today. Daughter Dr. Ann Pham present.  Patient febrile again today (as we spoke) and quite miserable with fevers. Having diarrhea. Also has some fluid overload.  Discussed differential with Dr. Pham earlier today. Also discussed by phone with Dr. Liu and Dr. Cuello. Discussed further imaging and possibility of liver biopsy.     Tmax 101.8 at midnight. Currently 101.   BETH 1:40 (very low titer)  F actin normal  RF normal  CRP improved      A/P  Febrile illness with elevated liver tests, predominant cholestatic picture (tbili 5.2, alkphos 475, , AST 77), along with anemia (11.1). 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     On antibiotics for 4 days now (cipro started 7/26) and zosyn added 7/28/17. Not really improved. Other testing non-revealing. Does not fit with autoimmune liver disease.     Transaminases improved. Bili slowly increasing, which can occur and lag with any cholestatic reaction (drug/toxin/infection).    Rec:   1.CT today for ongoing fevers  2. Stool studies: c diff and stool culture  3. Hold off on liver biopsy for now, as I think unlikely to reveal a definitive diagnosis. However, if picture continues to be unclear, a liver biopsy may be valuable to reassure other diagnoses are ruled out/to confirm our evaluations thus far and possibly could add to workup  4. Please order INR for tomorrow.    35 minutes spent, over 50% counseling and coordination of care.     Emily Sparrow MD  Hepatology Staff  [phone]

## 2017-07-30 NOTE — PLAN OF CARE
Problem: Goal Outcome Summary  Goal: Goal Outcome Summary  Outcome: No Change  Patient had elevated temperature beginning of night shift, 101.8 AX. Complained of feeling tingling in legs at time of fever. Given Tylenol. Diaphoretic afterwards as temp decreased. Temperature  97.0 AX mid-shift. Blood culture collected by VPT. Antibiotic coverage with Ciprofloxacin and Zosyn. Intermittent non-productive cough. Sats low; supplemental oxygen started at  2 L per nasal cannula. Dry mouth, may benefit from Biotene if it continues. She is worried about not knowing what is making her feel ill. LFT's remain elevated. Voiding dark cecile urine. Potassium levels stable after replacement     Problem: Infection, Risk/Actual (Adult)  Goal: Identify Related Risk Factors and Signs and Symptoms  Related risk factors and signs and symptoms are identified upon initiation of Human Response Clinical Practice Guideline (CPG)   Outcome: No Change    07/30/17 0654   Infection, Risk/Actual   Infection, Risk/Actual: Related Risk Factors exposure to microbes   Signs and Symptoms (Infection, Risk/Actual) body temperature changes;diaphoresis;heart rate increase;lab value changes

## 2017-07-31 VITALS
TEMPERATURE: 98.2 F | HEART RATE: 85 BPM | SYSTOLIC BLOOD PRESSURE: 126 MMHG | DIASTOLIC BLOOD PRESSURE: 76 MMHG | OXYGEN SATURATION: 95 % | HEIGHT: 71 IN | BODY MASS INDEX: 21.13 KG/M2 | WEIGHT: 150.9 LBS | RESPIRATION RATE: 18 BRPM

## 2017-07-31 LAB
ALBUMIN SERPL-MCNC: 1.8 G/DL (ref 3.4–5)
ALP SERPL-CCNC: 460 U/L (ref 40–150)
ALT SERPL W P-5'-P-CCNC: 152 U/L (ref 0–50)
ANION GAP SERPL CALCULATED.3IONS-SCNC: 10 MMOL/L (ref 3–14)
AST SERPL W P-5'-P-CCNC: 92 U/L (ref 0–45)
BACTERIA SPEC CULT: NO GROWTH
BILIRUB SERPL-MCNC: 4.6 MG/DL (ref 0.2–1.3)
BUN SERPL-MCNC: 5 MG/DL (ref 7–30)
CALCIUM SERPL-MCNC: 8 MG/DL (ref 8.5–10.1)
CHLORIDE SERPL-SCNC: 100 MMOL/L (ref 94–109)
CO2 SERPL-SCNC: 25 MMOL/L (ref 20–32)
CREAT SERPL-MCNC: 0.51 MG/DL (ref 0.52–1.04)
CRP SERPL-MCNC: 30 MG/L (ref 0–8)
ERYTHROCYTE [DISTWIDTH] IN BLOOD BY AUTOMATED COUNT: 15.1 % (ref 10–15)
GFR SERPL CREATININE-BSD FRML MDRD: ABNORMAL ML/MIN/1.7M2
GLUCOSE SERPL-MCNC: 122 MG/DL (ref 70–99)
HAV IGM SERPL QL IA: NORMAL
HCT VFR BLD AUTO: 30.3 % (ref 35–47)
HGB BLD-MCNC: 10.4 G/DL (ref 11.7–15.7)
INR PPP: 0.99 (ref 0.86–1.14)
Lab: NORMAL
MCH RBC QN AUTO: 29.1 PG (ref 26.5–33)
MCHC RBC AUTO-ENTMCNC: 34.3 G/DL (ref 31.5–36.5)
MCV RBC AUTO: 85 FL (ref 78–100)
MICRO REPORT STATUS: NORMAL
PLATELET # BLD AUTO: 531 10E9/L (ref 150–450)
POTASSIUM SERPL-SCNC: 3.6 MMOL/L (ref 3.4–5.3)
PROCALCITONIN SERPL-MCNC: 0.37 NG/ML
PROT SERPL-MCNC: 5.1 G/DL (ref 6.8–8.8)
RBC # BLD AUTO: 3.57 10E12/L (ref 3.8–5.2)
SODIUM SERPL-SCNC: 135 MMOL/L (ref 133–144)
SPECIMEN SOURCE: NORMAL
WBC # BLD AUTO: 6.5 10E9/L (ref 4–11)

## 2017-07-31 PROCEDURE — 85610 PROTHROMBIN TIME: CPT | Performed by: INTERNAL MEDICINE

## 2017-07-31 PROCEDURE — 87471 BARTONELLA DNA AMP PROBE: CPT | Performed by: INTERNAL MEDICINE

## 2017-07-31 PROCEDURE — 25000132 ZZH RX MED GY IP 250 OP 250 PS 637: Mod: GY | Performed by: STUDENT IN AN ORGANIZED HEALTH CARE EDUCATION/TRAINING PROGRAM

## 2017-07-31 PROCEDURE — A9270 NON-COVERED ITEM OR SERVICE: HCPCS | Mod: GY | Performed by: INTERNAL MEDICINE

## 2017-07-31 PROCEDURE — 99239 HOSP IP/OBS DSCHRG MGMT >30: CPT | Mod: GC | Performed by: INTERNAL MEDICINE

## 2017-07-31 PROCEDURE — A9270 NON-COVERED ITEM OR SERVICE: HCPCS | Mod: GY | Performed by: STUDENT IN AN ORGANIZED HEALTH CARE EDUCATION/TRAINING PROGRAM

## 2017-07-31 PROCEDURE — 25000132 ZZH RX MED GY IP 250 OP 250 PS 637: Mod: GY | Performed by: INTERNAL MEDICINE

## 2017-07-31 PROCEDURE — 25000128 H RX IP 250 OP 636: Performed by: INTERNAL MEDICINE

## 2017-07-31 PROCEDURE — 85027 COMPLETE CBC AUTOMATED: CPT | Performed by: INTERNAL MEDICINE

## 2017-07-31 PROCEDURE — 25000128 H RX IP 250 OP 636: Performed by: STUDENT IN AN ORGANIZED HEALTH CARE EDUCATION/TRAINING PROGRAM

## 2017-07-31 PROCEDURE — 36415 COLL VENOUS BLD VENIPUNCTURE: CPT | Performed by: INTERNAL MEDICINE

## 2017-07-31 PROCEDURE — 86140 C-REACTIVE PROTEIN: CPT | Performed by: INTERNAL MEDICINE

## 2017-07-31 PROCEDURE — 80053 COMPREHEN METABOLIC PANEL: CPT | Performed by: INTERNAL MEDICINE

## 2017-07-31 PROCEDURE — 86622 BRUCELLA ANTIBODY: CPT | Performed by: INTERNAL MEDICINE

## 2017-07-31 PROCEDURE — 86611 BARTONELLA ANTIBODY: CPT | Performed by: INTERNAL MEDICINE

## 2017-07-31 PROCEDURE — 84145 PROCALCITONIN (PCT): CPT | Performed by: INTERNAL MEDICINE

## 2017-07-31 RX ORDER — POTASSIUM CHLORIDE 1.5 G/1.58G
20 POWDER, FOR SOLUTION ORAL DAILY
Qty: 5 TABLET | Refills: 0 | Status: SHIPPED | OUTPATIENT
Start: 2017-07-31

## 2017-07-31 RX ORDER — URSODIOL 250 MG/1
250 TABLET, FILM COATED ORAL 2 TIMES DAILY
Qty: 10 TABLET | Refills: 0 | Status: SHIPPED | OUTPATIENT
Start: 2017-07-31 | End: 2017-08-05

## 2017-07-31 RX ORDER — CIPROFLOXACIN 500 MG/1
500 TABLET, FILM COATED ORAL 2 TIMES DAILY
Qty: 17 TABLET | Refills: 0 | Status: SHIPPED | OUTPATIENT
Start: 2017-07-31 | End: 2021-08-10

## 2017-07-31 RX ORDER — FUROSEMIDE 20 MG
20 TABLET ORAL DAILY
Qty: 5 TABLET | Refills: 0 | Status: SHIPPED | OUTPATIENT
Start: 2017-07-31 | End: 2021-08-10

## 2017-07-31 RX ADMIN — URSODIOL 250 MG: 250 TABLET, FILM COATED ORAL at 08:25

## 2017-07-31 RX ADMIN — ACETAMINOPHEN 650 MG: 325 TABLET, FILM COATED ORAL at 13:34

## 2017-07-31 RX ADMIN — CIPROFLOXACIN 400 MG: 2 INJECTION, SOLUTION INTRAVENOUS at 00:22

## 2017-07-31 RX ADMIN — PIPERACILLIN AND TAZOBACTAM 3.38 G: 3; .375 INJECTION, POWDER, LYOPHILIZED, FOR SOLUTION INTRAVENOUS; PARENTERAL at 10:09

## 2017-07-31 RX ADMIN — PIPERACILLIN AND TAZOBACTAM 3.38 G: 3; .375 INJECTION, POWDER, LYOPHILIZED, FOR SOLUTION INTRAVENOUS; PARENTERAL at 16:17

## 2017-07-31 RX ADMIN — PIPERACILLIN AND TAZOBACTAM 3.38 G: 3; .375 INJECTION, POWDER, LYOPHILIZED, FOR SOLUTION INTRAVENOUS; PARENTERAL at 04:06

## 2017-07-31 RX ADMIN — CIPROFLOXACIN 400 MG: 2 INJECTION, SOLUTION INTRAVENOUS at 12:38

## 2017-07-31 RX ADMIN — ACETAMINOPHEN 650 MG: 325 TABLET, FILM COATED ORAL at 04:13

## 2017-07-31 NOTE — PROGRESS NOTES
Oxygen saturations were completed at rest and with activity.     Resting saturation on RA: 95 % with pulse of 82.  Resting saturation on O2: 95 on 0 LPM. .    Walking saturation on RA 97 % with pulse of 86 throughout walk of one loop around station, 1/11 mile.  Walking saturation on O2:  97 on 0 LPM.     Patient was able to walk 480 ft and tolerated it well, oxygen sats didn't drop at all from the 97 %.

## 2017-07-31 NOTE — PLAN OF CARE
Problem: Goal Outcome Summary  Goal: Goal Outcome Summary  Outcome: No Change  VSS.  Temp max 99.3.  CT scan done.  Enteric panel and cdiff negative.  Pt eating slightly better.  Magic cup ordered.  Pt frustrated that she is not feeling well and has mixed emotions about all the tests being negative.  Daughter present most of shift.  K 3.5, Mg 1.8.  Will continue to monitor.

## 2017-07-31 NOTE — PLAN OF CARE
Problem: Goal Outcome Summary  Goal: Goal Outcome Summary  Outcome: Adequate for Discharge Date Met:  07/31/17  Pt discharged to: Home   Via: Car  Accompanied by: Escorted to lobby to meet daughter  Belongings: One bag of belongings taken with by pt, no additional personal items found in room  Teaching: Discharge teaching including medications and when to take, f/u with ID and recommendation to f/u w/ PCP reviewed. Questions answered  Clinic appointment: To f/u with ID on 8/3, including labs  Report called/faxed: N/A     Afebrile. Pt denies pain, N/V/D/C. Sclera less jaundice today, and overall pt states feels better when not febrile but continues to be fatigued. Encouraged pt to be intentional in getting adequate rest @ home post d/c. Pt states feels comfortable with instructions. Medications obtained from d/c pharmacy.            Problem: Infection, Risk/Actual (Adult)  Goal: Identify Related Risk Factors and Signs and Symptoms  Related risk factors and signs and symptoms are identified upon initiation of Human Response Clinical Practice Guideline (CPG)   Outcome: Adequate for Discharge Date Met:  07/31/17 07/31/17 1727   Infection, Risk/Actual   Infection, Risk/Actual: Related Risk Factors exposure to microbes   Signs and Symptoms (Infection, Risk/Actual) body temperature changes;weakness  (ESPINOZA)       Goal: Infection Prevention/Resolution  Patient will demonstrate the desired outcomes by discharge/transition of care.   Outcome: Adequate for Discharge Date Met:  07/31/17 07/31/17 1727   Infection, Risk/Actual (Adult)   Infection Prevention/Resolution making progress toward outcome

## 2017-07-31 NOTE — DISCHARGE SUMMARY
"[facility] [place]  Internal Medicine Discharge Summary- [facility] Service    Date of Admission:  7/25/2017  Date of Discharge:  7/31/2017  Discharging Attending Provider: Dr. Vanessa Cuello  Discharge Team: Katelin 3    Discharge Diagnoses   1. 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 Likely Non Cardiogenic Pulmonary Edema  3. Hypoalbuminemia     Follow-ups Needed After Discharge   1. Follow up with infectious disease on Thursday, August 3, 2017 with evaluation of Creatinine, LFTs, and CRP at that time to assess response to abx.   2. Follow up with PCP in 1 week.  Was discharged home with 5 days of PO Lasix and KCl due to volume overload while inpatient . Recommend following nutritional status, weight, and volume status.  Labs that are still pending and will need follow up include Bartonella and Brucella serologies.     Medication Changes  1. Take oral Ciprofloxacin 500mg PO BID for a total 14 day course (07/26/17 - 08/08/17)  2. Continue Ursodiol 250mg PO BID for a total 7 days (7/30/17 - 8/5/17) for cholestasis  3. Lasix 20 mg PO qday for 5 days due to resolving pulmonary edema.   4. KCl 20 mEQ qday for 5 days due to mild hypokalemia on Lasix requiring intermittent replacement    Hospital Course   Tameka Hebert is a pleasant 73 y/o F with no significant PMH who was admitted to [facility] on 7/25/17 at the behest of her PCP for evaluation of recurrent fevers without source that occurred every 4-6 hours \"like clockwork\" associated with occipital headaches, fatigue, chills,  Myalgias, with a recent travel hx to [place] 6/9-6/23 and [place] where she ate a quesadilla with a brief self limited diarrheal illness and fevers following this meal. Please see admission History and Physical Note for further HPI.     1. Cyclical Fevers and Elevated Liver Enzymes initially in a hepatocellular pattern which then transitioned to a Cholestatic Pattern likely 2/2 Non Typhoidal " Salmonella Infection leading to Chronic Cholecystitis: The patient initially presented to [facility] after being treated with doxycyline for 6 days with no resolution of fevers. These fevers were associated with chills, numbness and tingling, and occasionally nausea/vomiting. Of note, she denied having pets at home, [place] or [place] a farm, taking any outpatient medications. Initial infectious work-up including Anaplasmosis, Ehrlichiosis, Babesiosis, EBV, CMV, HIV, West Nile Virus, HAV, HBV, HCV, acute Parvovirus B19 infection negative. MRCP, HIDA, and Abd U/S were completed and were all negative for acute cholecystitis or acute biliary obstruction. ID was consulted who recommended discontinuation of doxycycline and addition of ciprofloxacin and zosyn for possible non-typhi salmonella infection leading to intermittent bacteremia and fevers. She continued to intermittently spike fevers on this regimen for 3 days but her transaminases resolved to near normal levels from the mid 400s and her CRP and procal downtrended. Her bilirubin lagged behind her transaminases but also begin to downtrend after the addition of ursodiol. 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 She will need to finish a course of 14 days total of antibiotics and she will be discharged home on ciprofloxacin 500mg qday through 8/8/17.    2. Likely Non-Cardiogenic pulmonary edema; Hypoalbuminemia: The patient was aggressively fluid resuscitated on presentation due to her poor po intake prior to admission, fevers, tachycardia, elevated transaminases concerning for sepsis. She did develop pulmonary edema and bilateral small pleural effusions in this setting along with a 16lb weight gain and a new 2L O2 requirement. This  "resolved with 3 days of IV diuresis and appropriate decrease in her weight. Her dry weight was ~145 lbs and she was discharged at a weight of 150lbs. The cause of this was presumed to be her poor nutritional status in the setting of illness with an albumin of 1.8. She has previously had transthoracic echos that have showed a normal ef and no diastolic dysfunction with no hx of ischemic disease. On day of discharge, she ambulated with RN with no desaturations. She will be discharged home on 5 additional days of PO Lasix 20mg and KCl 20 mEQ qday for gentle diuresis.     Consultations This Hospital Stay   NUTRITION SERVICES ADULT IP CONSULT  VASCULAR ACCESS CARE ADULT IP CONSULT  INFECTIOUS DISEASE GENERAL ADULT IP CONSULT  GI HEPATOLOGY ADULT IP CONSULT  VASCULAR ACCESS CARE ADULT IP CONSULT  VASCULAR ACCESS CARE ADULT IP CONSULT  VASCULAR ACCESS CARE ADULT IP CONSULT  NUTRITION SERVICES ADULT IP CONSULT     Code Status   Full Code     The patient was discussed with Dr. Cuate Rosenberg MD  PGY - 2  Internal Medicine/Pediatrics  [facility]  Pager:  [phone]  ______________________________________________________________________    Physical Exam   Vital Signs:/76 (BP Location: Right arm)  Pulse 85  Temp 98.2  F (36.8  C) (Temporal)  Resp 18  Ht 1.8 m (5' 10.87\")  Wt 68.4 kg (150 lb 14.4 oz)  SpO2 95%  BMI 21.13 kg/m2 RM AIR  Weight: 150 lbs 14.4 oz    General: Alert, not in respiratory or acute distress  HEENT: NCAT, sclera mildly icteric, MMM  CV: RRR, nl S1/S2  Lungs: Mild bibasilar crackles. No increased WOB.  Abd: Soft, nontender, nondistended, +BS   Ext: WWP  Skin: Jaundice present  Neuro: AOx3, no focal neurologic deficits    Significant Results and Procedures   Most Recent 3 CBC's:  Recent Labs   Lab Test  07/31/17   0344  07/30/17   0508  07/29/17   0410   WBC  6.5  7.6  6.1   HGB  10.4*  11.1*  10.1*   MCV  85  85  84   PLT  531*  473*  437   Most Recent 3 " BMP's:  Recent Labs   Lab Test  07/31/17   0344  07/30/17   1955  07/30/17   0508   07/29/17   0410   NA  135   --   137   --   139   POTASSIUM  3.6  3.5  3.9   < >  3.1*   CHLORIDE  100   --   104   --   104   CO2  25   --   26   --   25   BUN  5*   --   5*   --   6*   CR  0.51*   --   0.49*   --   0.46*   ANIONGAP  10   --   7   --   9   ROSANA  8.0*   --   7.9*   --   7.5*   GLC  122*   --   109*   --   110*    < > = values in this interval not displayed.     Most Recent 2 LFT's:  Recent Labs   Lab Test  07/31/17   0344 07/30/17   0508   AST  92*  77*   ALT  152*  162*   ALKPHOS  460*  475*   BILITOTAL  4.6*  5.2*     Most Recent 3 INR's:  Recent Labs   Lab Test  07/31/17   0344 07/27/17   0341  02/26/10   0045   INR  0.99  1.12  0.92     Most Recent 6 Bacteria Isolates From Any Culture (See EPIC Reports for Culture Details):  Recent Labs   Lab Test  07/30/17   1120  07/30/17   0051  07/28/17   0725  07/26/17   0401   CULT  No growth  No growth after 1 day  No growth after 3 days  No growth after 5 days     Most Recent Urinalysis:  Recent Labs   Lab Test  07/25/17   1928   COLOR  Dark Yellow   APPEARANCE  Clear   URINEGLC  Negative   URINEBILI  Moderate   This is an unconfirmed screening test result. A positive result may be false.  *   URINEKETONE  5*   SG  1.024   UBLD  Negative   URINEPH  6.5   PROTEIN  10*   NITRITE  Negative   LEUKEST  Negative   RBCU  1   WBCU  3*       ,   Results for orders placed or performed during the hospital encounter of 07/25/17   MR Abdomen MRCP w/o & w Contrast    Narrative    MRCP Without and With Contrast    CLINICAL HISTORY: fever of unknown origin and elevated AP, AST, and  ALT    DATE: 7/25/2017 6:51 PM    TECHNIQUE:     Images were acquired with and without intravenous gadolinium contrast  through the upper abdomen. The following MR images were acquired  without intravenous contrast: TrueFISP, multiplanar T2-weighted, axial  T1 in/out of phase, T2-weighted MRCP images, axial  diffusion-weighted  and axial apparent diffusion coefficient. T1-weighted images were  obtained before contrast at the multiple time points following  contrast injection. 3-D reformatted images were generated by the  technologist. Contrast dose: 7.5mL Gadavist    Comparison study: No similar study.    FINDINGS:    Biliary Tree: No intra or extrahepatic biliary dilatation.  Variant  low medial insertion of the cystic duct (series 101 image 6).    Pancreas: Preserved T1 increased signal in the precontrast images. 4  mm cystic focus in the pancreatic head, series 15 image 9; there are  two 3 mm cystic foci in the pancreatic tail, series 4 image 13 and 14.  Pancreatic duct with normal diameter. Mildly dilated side branches in  the head and uncinate process.    Liver: Enlarged liver measuring 18.2 cm in length. The left lobe of  the liver is elongated extending up to the left upper quadrant  abutting the superior pole of the spleen. Scattered liver cysts with  bright T2 signal and no contrast enhancement the largest one measuring  1.1 cm in the left lobe of the liver, segment 2. No solid focal liver  lesions. No hepatic steatosis or iron deposition.    Gallbladder: While the gallbladder is nondistended, there are several  gallstones. Additionally, there is mucosal hyperemia and there is is  moderate pericholecystic fluid. Variant low medial insertion of the  cystic duct (series 101 image 6).    Spleen: Not enlarged. No focal lesions.    Kidneys: Rotational anomaly of the right kidney. 1.1 cm left kidney  cyst in the interpolar region. No hydronephrosis.    Adrenal glands: Unremarkable.    Bowel: No dilated loops of bowel.    Lymph nodes: Subcentimeter retroperitoneal lymph nodes, not enlarged  by size criteria.    Blood vessels: No abdominal aortic aneurysm. Moderate periportal  edema.    Lung bases: Trace right pleural effusion. No left pleural effusion.  The lateral lower lobes dependent atelectasis.    Bones and soft  tissues: No suspicious lesions in the bones. Mild  degenerative changes of the spine. Mild lumbar curvature with  convexity to the right.    Mesentery and abdominal wall: Within normal limits.    Ascites: No ascites.      Impression    IMPRESSION:   1.  Cholelithiasis and moderate amount of pericholecystic inflammatory  changes and fluid. While no obstructing stone is identified,  correlation for symptoms of right upper quadrant pain and potentially  right upper quadrant ultrasound would be beneficial.  2. Mild hepatomegaly with elongated left lobe of the liver extending  up to the left upper quadrant. Scattered liver cysts. No solid focal  liver lesions.  3. 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 Recommend follow-up according to criteria described below in  the present report.    [facility] recommendations for asymptomatic pancreatic  cysts, modified from international consensus guidelines*   Size of largest cyst:   Less than 1 cm: Follow-up imaging in 6 months to 1 year, then lengthen  interval to 2-3 years if no change.  1-2 cm: Follow-up imaging at 6 months, then yearly for 2 years, then  lengthen interval if no change.   The optimal initial study or first follow-up exam is MRI/MRCP  performed with intravenous gadolinium contrast to allow full cyst  characterization. Once characterized, contrast is optional on  follow-up MRIs. If MRI is contraindicated, CT with contrast is  recommended.   GI consultation for possible endoscopic ultrasound is recommended for  cysts with the following features: Size > 2 cm, >20% growth on  followup, wall thickening or enhancement, mural nodule, duct > 5 mm,  or abrupt change in duct caliber with distal atrophy. GI or surgical  consultation is also recommended for symptomatic cysts.   *Reference: International Consensus Guidelines for Management of IPMN  and MCN of the pancreas. Pancreatology: 12:(2012); 183-197.    I have personally  reviewed the examination and initial interpretation  and I agree with the findings.    BERYL HERNANDEZ MD   XR Chest Port 1 View    Narrative    XR CHEST PORT 1 VW, 7/25/2017 5:14 PM.    Comparison: 7/25/2017.    History: fever.    Findings:   Cardiomediastinal silhouette and pulmonary vasculature within normal  limits. Clear lungs and costophrenic angles. No pleural effusion or  pneumothorax. No soft tissue abnormalities. No concerning bony  lesions.       Impression    Impression:   No acute findings in the chest.    I have personally reviewed the examination and initial interpretation  and I agree with the findings.    SAVAGE KOVACS MD   NM HepatOBiliary Scan    Narrative    Examination:  Nuclear medicine hepatobiliary scan      Date: 7/26/2017 4:28 PM.    Indication:   Elevated liver enzymes     Technique:    The patient received 5.30 mCi of Tc-99m Choletec intravenously. Images  were obtained out through 60 minutes. At 60 minutes morphine was  administered.    Findings:    There is prompt clearance of the radionuclide from the blood pool into  the liver. By 10 minutes there is clear visualization of the  intrahepatic ducts as well as the upper common bile duct. By 25  minutes there is visualization of the gallbladder. At 60 minutes there  is emptying from the common bile duct into the small bowel. After  morphine administration, there is more convincing radiotracer filling  within the gallbladder. On post morphine images there is further  excretion of radiotracer into the small bowel.    Enterogastric reflux was not present.    There is poor washout of radiotracer from the liver even on delayed  images.      Impression    Impression:  1. No evidence of acute cholecystitis.  2. Poor washout of radiotracer from the liver, consistent with  hepatocellular dysfunction.    I have personally reviewed the examination and initial interpretation  and I agree with the findings.    SAVAGE KOVACS MD   [facility]  w Abd/Pelvis Duplex Complete    Narrative    EXAMINATION: Right upper quadrant ultrasound with Doppler, 7/27/2017  9:53 AM     COMPARISON: MR 7/25/2017    HISTORY: Elevated liver function tests, fever    TECHNIQUE: The abdomen was scanned in standard fashion with  specialized ultrasound transducer(s) using both gray-scale, color  Doppler, and spectral flow techniques.    Findings:    Liver: Liver is normal size and echogenicity. No focal liver lesion.  Cysts seen on recent MRI are not well visualized on the current exam.    Extrahepatic portal vein flow is antegrade, measuring 24 cm/sec.  Portal vein measures 14 mm.  Right portal vein flow is antegrade, measuring 21 cm/sec.  Left portal vein flow is antegrade, measuring 24 cm/sec.    Flow in the hepatic artery is towards the liver and:  107 cm/sec peak systolic  0.70 resistive index.     The splenic vein is patent and flow is towards the liver.  The left,  middle, and right hepatic veins are patent with flow towards the IVC.  The IVC is patent with flow towards the heart.   The visualized aorta  is not dilated.    Gallbladder: Circumferential heterogeneous gallbladder wall  thickening, measuring up to 1.3 cm. No discrete pericholecystic fluid.  Few mobile stones in the gallbladder, as demonstrated on MR 7/25/2017.  Negative sonographic Cortés's sign.    Bile Ducts: Both the intra- and extrahepatic biliary system are of  normal caliber.  The common bile duct measures 4 mm in diameter.    Pancreas: Visualized portions of the head and body of the pancreas are  unremarkable.     Kidneys: Right kidney is of normal echotexture, without mass or  hydronephrosis.   The craniocaudal dimension is 10.3 cm.    Fluid: Small right pleural effusion.      Impression    Impression:   1.  Circumferential gallbladder wall thickening measuring up to 13 mm,  with cholelithiasis, overall similar to seen on MRI of 7/25/2017.  Nuclear medicine hepatobiliary scan performed yesterday  demonstrated  no evidence of acute cholecystitis.  2.  Small right pleural effusion.  3.  Normal hepatic Doppler study.    I have personally reviewed the examination and initial interpretation  and I agree with the findings.    DARLINE SHERMAN MD   XR Chest Port 1 View    Narrative    Exam: XR CHEST PORT 1 VW, 7/28/2017 9:58 AM    Indication: Dyspnea    Comparison: 7/25/2017, 2/26/2010    Findings:   A single AP view of the chest was obtained. The cardiomediastinal  silhouette is within normal limits. Mildly decreased lung volumes.  Small left pleural effusion. No pneumothorax. Mild bibasilar  opacities. The upper abdomen is unremarkable.      Impression    Impression:   1. Increased bibasilar atelectasis/consolidation, worrisome for  infection or edema.  2. Small left pleural effusion, increased.    I have personally reviewed the examination and initial interpretation  and I agree with the findings.    JEFF LOGAN MD   XR Chest Port 1 View    Narrative    EXAMINATION: XR CHEST PORT 1 VW, 7/29/2017 10:01 AM    COMPARISON: 7/28/2017    HISTORY: shortness of breath    FINDINGS: Cardiac silhouette is within normal limits. Small pleural  effusions and basilar opacities which have increased. Upper lungs  appear clear and unchanged.       Impression    IMPRESSION: Increased small bilateral pleural effusions with  associated basilar atelectasis/consolidation.    DEISI WALTER MD   XR Chest Port 1 View    Narrative    History: Shortness of breath. Evaluate known pleural effusions.    COMPARISON: Single portable chest performed yesterday at 0915 hours.    FINDINGS: Stable small bilateral layering pleural effusions and  associated atelectasis. Diffuse interstitial prominence throughout the  chest is largely chronic, at least partly going back to two-view chest  2/26/2010 when accounting for reduced lung volumes on the current  study. No new airspace opacity or pneumothorax. Cardiac silhouette and  pulmonary  vasculature are unchanged and within normal limits. Bones  are osteopenic. No acute bony abnormality is about the thorax on this  single image.      Impression    IMPRESSION: Stable chest including small bilateral layering pleural  effusions and associated atelectasis.    WILLIE SULLIVAN MD   CT Abdomen Pelvis w Contrast    Narrative    Exam: CT abdomen pelvis with contrast 7/30/2017 4:18 PM.    History: Fever of unknown origin.    Comparison: MRI 7/25/2017.    Technique: CT images from the lung bases through the symphysis pubis  after the uneventful administration of IV contrast     Findings:   Multiple hypodense lesions of the kidneys too small to characterize.  Pericholecystic fluid in the setting of decompressed gallbladder  without definite stones or wall thickening. This is unchanged since  7/25/2017. Multiple scattered hypodense lesions in the liver, some  have simple attenuation and consistent with benign etiology while  others are too small to characterize.  The pancreas, spleen, and adrenal glands are unremarkable. No  abnormally dilated loops of small bowel or colon. Mild free fluid in  the pelvis, no free air. Probable reactive portacaval lymph node. Mild  aortoiliac calcification.    Bilateral pleural effusions with associated compressive by basilar  atelectasis/consolidation. Calcification in the right lung base. No  suspicious bony lesions. Mild degenerative changes of the spine.      Impression    Impression:   1. Unchanged findings of pericholecystic fluid in the setting of  decompressed gallbladder and no wall thickening.  2. Bilateral mild-to-moderate pleural effusions with overlying  compressive atelectasis/consolidation.  3. Mild free fluid in the pelvis.  4. Consider PET scan for fever of unknown origin.    I have personally reviewed the examination and initial interpretation  and I agree with the findings.    GORAN BHANDARI MD       Pending Results   These results will be followed up by  ID Consultation and Primary Care  Unresulted Labs Ordered in the Past 30 Days of this Admission     Date and Time Order Name Status Description    7/31/2017 0150 Bartonella Species by PCR In process     7/30/2017 2353 B Henselae antibody IgG and IgM In process     7/30/2017 2353 Brucella species antibody In process     7/30/2017 0507 Blood culture Preliminary     7/28/2017 0547 Blood culture Preliminary     7/27/2017 1041 Jessica Barr Virus Qualitative PCR In process     7/25/2017 2200 Blood culture Preliminary              Primary Care Physician   Obed Salgado    Discharge Disposition   Discharged to home  Condition at discharge: Stable    Discharge Orders     Comprehensive metabolic panel     CRP inflammation     Reason for your hospital stay   You were admitted for fever of unknown origin. You came in on doxycycline since your fever was thought to be from a tick-borne disease. However, all the tests for tick-born illnesses were negative so the doxycycline was stopped and you were switched to different antibiotics. Infectious disease, gastroenterology, and hepatology were consulted during your stay. Infection due to a non-typhoidal strain of Salmonella was ultimately thought to be the cause. Your liver enzymes down-trended during your stay and fevers became less frequent. You received IV fluids early on in your stay for dehydration and are now on lasix to relieve some of the fluid that accumulated. You are going home on antibiotics by mouth.     Follow Up and recommended labs and tests   Follow up with primary care provider, Obed Salgado, within 7 days for hospital follow- up.  The following labs/tests are recommended: CBC and CMP.     Activity   Your activity upon discharge: activity as tolerated     Full Code     Diet   Follow this diet upon discharge: Regular        Discharge Medications   Discharge Medication List as of 7/31/2017  3:19 PM      START taking these medications    Details   ursodiol (ACTIGALL)  250 MG tablet Take 1 tablet (250 mg) by mouth 2 times daily for 5 days, Disp-10 tablet, R-0, E-Prescribe      furosemide (LASIX) 20 MG tablet Take 1 tablet (20 mg) by mouth daily for 5 days, Disp-5 tablet, R-0, E-Prescribe      potassium chloride (KLOR-CON) 20 MEQ Packet Take 20 mEq by mouth daily, Disp-5 tablet, R-0, E-Prescribe      ciprofloxacin (CIPRO) 500 MG tablet Take 1 tablet (500 mg) by mouth 2 times daily, Disp-17 tablet, R-0, E-Prescribe         STOP taking these medications       IBUPROFEN PO Comments:   Reason for Stopping:         DOXYCYCLINE HYCLATE PO Comments:   Reason for Stopping:             Allergies   Allergies   Allergen Reactions     Codeine Nausea and Vomiting

## 2017-07-31 NOTE — PROGRESS NOTES
[facility], [place]    Internal Medicine Progress Note - [facility] Service    Main Plans for Today   1) continue IV zosyn/ceftriaxone  2) encourage PO intake     Assessment & Plan   Tameka Hebert is an otherwise healthy  72 year old female admitted on 7/25/2017 with cyclic fevers with now resolved nausea and vomiting and development of shortness of breath.     #fevers with nausea and vomiting and cholestatic LFTs: Ddx infectious vs. Rheumatologic vs. Malignancy. Infectious etiology most likely considering her elevated CRP and procalcitonin levels on admission (69 and 0.64). Working hypothesis is possible infection from non-typhoid salmonella as it can colonize in the gall bladder and cause recurrent fevers.. Per ID, unlikely rickettsial infection since her previously started course of doxycycline should have shown marked clinical improvement. Doxy d/c'ed 7/26.  Patient on IV ciprofloxacin and zosyn with improvement in procalcitnonin and CRP labs.  (Procalcitnonin 0.56 on 7/27, 0.47 on 7/28, 0.41 on 7/29, 0.38 on 7/30; CRP: 60 on 7/27, 54 on 7/28, and 50 on 7/29, 41 on 7/30, 30 on 7/31). Patient continues to spike fevers though with longer intervals where she remains asymptomatic. LFTs trending down and bilirubin dropped from 5.2 to 4.6 on 7/31; on ursodiol. Concern for a possible obstructive process causing infection unlikely given negative findings from HIDA scan and AB-US. CT of abdomen and pelvis unremarkable. Rheumatologic/autoimmune etiology less likely with normal ESR, BETH, and F actin. Malignancy very unlikely given acute onset of symptoms and lack of weight loss but if infectious workup does not yield clinical improvement, can consider UPEP/SPEP.   1) monitor CBC and blood cultures, and trend CRP, and procalcitonin  2) continue ciprofloxacin zosyn, and ursodiol  3) await brucella and bartonella results           #shortness of breath and chest tightness likely 2/2 fluid  "overload  #costochondritis  patient complained of chest tightness and feeling \"winded\" on 7/28 in the AM and tenderness to palpation on sternum. CXR and EKG ordered. EKG unremarkable and CXR showed some bibasilar pulmonary edema likely secondary to volume overload. Patient up 16 pounds on 7/28 since admission. NT-BNP elevated at 10,870. Elevated levels could be from some baseline diastolic dysfunction due to age, from elevated fluid levels stressing the heart, or possible acute CHF development. Low concern for new CHF diagnosis given her relatively asymptomatic presentation between periods of spiking a fever. Low concern for possible HCAP or PE acquired in hospital but on radar as possible complication.   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 Recommend follow-up: Less than 1 cm: Follow-up imaging in 6 months to 1 year, then lengthen interval to 2-3 years if no change.  -follow up in outpatient with GI     Diet: Room Service  Snacks/Supplements Adult: With Meals  Regular Diet Adult  Snacks/Supplements Adult: Magic Cup; Between Meals  Fluids: normal PO intake  DVT Prophylaxis: ambulate  Code Status: full code     Disposition Plan   Expected discharge: pending continued offloading of fluids and decrease in fever curve, likely able to be discharged home today or tomorrow.      Entered: Romero Lopez 07/31/2017, 7:50 AM   Information in the above section will display in the discharge planner report.      The patient's care was discussed with Dr. Cuello and Dr. Helga Lopez  [facility]  Pager: [phone]  Please see sticky note for cross cover information    Interval History   Patient did not complain of high temps this AM. " "Feeling subjectively \"better\" overall with increased appetite. Commented that she smelled coffee this morning from her door and for the first time it sounded appetizing. Still complaining of cough and costochondritis. Continues to void adequately and no change in BM noted.     Physical Exam   Vital Signs: Temp: 100.5  F (38.1  C) Temp src: Temporal BP: 128/72 Pulse: 84 Heart Rate: 84 Resp: 18 SpO2: 95 % O2 Device: Nasal cannula Oxygen Delivery: 3 LPM  Weight: 155 lbs 3.2 oz  General Appearance: A&Ox3, conversant, in better spirits than day prior  Respiratory: some fine bibasilar inspiratory crackles  Cardiovascular: RRR normal S1 and S2, no r/m/g  GI: +BSx4, nontender to palpation, nondistended  Skin: no rashes to appreciate  Extremities: lower extremities 1+ pitting edema         Data   Medications        ursodiol  250 mg Oral BID     piperacillin-tazobactam  3.375 g Intravenous Q6H     ciprofloxacin  400 mg Intravenous Q12H     sodium chloride (PF)  3 mL Intracatheter Q8H     Data     Recent Labs  Lab 07/31/17  0344 07/30/17  1955 07/30/17  0508  07/29/17  0410  07/27/17  0341  07/25/17  1324   WBC 6.5  --  7.6  --  6.1  < > 6.4  --  5.5   HGB 10.4*  --  11.1*  --  10.1*  < > 10.0*  --  11.5*   MCV 85  --  85  --  84  < > 86  --  88   *  --  473*  --  437  < > 331  --  244   INR 0.99  --   --   --   --   --  1.12  --   --      --  137  --  139  < > 137  < > 138   POTASSIUM 3.6 3.5 3.9  < > 3.1*  < > 3.7  < > 3.5   CHLORIDE 100  --  104  --  104  < > 104  < > 104   CO2 25  --  26  --  25  < > 22  < > 26   BUN 5*  --  5*  --  6*  < > 12  < > 9   CR 0.51*  --  0.49*  --  0.46*  < > 0.51*  < > 0.50*   ANIONGAP 10  --  7  --  9  < > 10  < > 8   ROSANA 8.0*  --  7.9*  --  7.5*  < > 7.7*  < > 8.3*   *  --  109*  --  110*  < > 102*  < > 96   ALBUMIN 1.8*  --  1.8*  --  1.6*  < > 1.8*  < > 2.3*   PROTTOTAL 5.1*  --  5.4*  --  4.9*  < > 5.0*  < > 5.7*   BILITOTAL 4.6*  --  5.2*  --  4.9*  < > 3.6*  < > 2.8* "   ALKPHOS 460*  --  475*  --  489*  < > 537*  < > 594*   *  --  162*  --  166*  < > 262*  < > 350*   AST 92*  --  77*  --  47*  < > 116*  < > 132*   LIPASE  --   --   --   --   --   --   --   --  134   < > = values in this interval not displayed.  Recent Results (from the past 24 hour(s))   XR Chest Port 1 View    Narrative    History: Shortness of breath. Evaluate known pleural effusions.    COMPARISON: Single portable chest performed yesterday at 0915 hours.    FINDINGS: Stable small bilateral layering pleural effusions and  associated atelectasis. Diffuse interstitial prominence throughout the  chest is largely chronic, at least partly going back to two-view chest  2/26/2010 when accounting for reduced lung volumes on the current  study. No new airspace opacity or pneumothorax. Cardiac silhouette and  pulmonary vasculature are unchanged and within normal limits. Bones  are osteopenic. No acute bony abnormality is about the thorax on this  single image.      Impression    IMPRESSION: Stable chest including small bilateral layering pleural  effusions and associated atelectasis.    WILLIE SULLIVAN MD   CT Abdomen Pelvis w Contrast    Narrative    Exam: CT abdomen pelvis with contrast 7/30/2017 4:18 PM.    History: Fever of unknown origin.    Comparison: MRI 7/25/2017.    Technique: CT images from the lung bases through the symphysis pubis  after the uneventful administration of IV contrast     Findings:   Multiple hypodense lesions of the kidneys too small to characterize.  Pericholecystic fluid in the setting of decompressed gallbladder  without definite stones or wall thickening. This is unchanged since  7/25/2017. Multiple scattered hypodense lesions in the liver, some  have simple attenuation and consistent with benign etiology while  others are too small to characterize.  The pancreas, spleen, and adrenal glands are unremarkable. No  abnormally dilated loops of small bowel or colon. Mild free fluid  in  the pelvis, no free air. Probable reactive portacaval lymph node. Mild  aortoiliac calcification.    Bilateral pleural effusions with associated compressive by basilar  atelectasis/consolidation. Calcification in the right lung base. No  suspicious bony lesions. Mild degenerative changes of the spine.      Impression    Impression:   1. Unchanged findings of pericholecystic fluid in the setting of  decompressed gallbladder and no wall thickening.  2. Bilateral mild-to-moderate pleural effusions with overlying  compressive atelectasis/consolidation.  3. Mild free fluid in the pelvis.  4. Consider PET scan for fever of unknown origin.    I have personally reviewed the examination and initial interpretation  and I agree with the findings.    GORAN BHANDARI MD

## 2017-07-31 NOTE — PROGRESS NOTES
CLINICAL NUTRITION SERVICES    JEAN CLAUDE bonilla < 3 consult received, again. RD is following as of 7/26/17.     Pt c/w a poor appetite, but notes that it has improved in the past few days. Thus far today, ate 75% of oatmeal this morning. Dislikes the magic cups.   Pt feels she will eat better at home because she will get food that sounds more appealing to her.      Interventions  Discontinued magic cups. Provided encouragement to eat to maintain nutrition status and strength. Encouraged protein intake and to consume small, frequent meals throughout the day.    Juan Lanier RD, LD  5C/BMT Dietitian  Pager: [phone]

## 2017-07-31 NOTE — PROGRESS NOTES
"S: Feeling better today. Ate oatmeal for breakfast. Talking about going home and ready to do so, with lab followup. Temp 100.5 at 4 am, T yesterday at 1226 101.    CT from yesterday reviewed. Unremarkable. Bili down today from 5.2 to 4.6.    O  Vitals: /76 (BP Location: Right arm)  Pulse 85  Temp 99.4  F (37.4  C) (Axillary)  Resp 18  Ht 1.8 m (5' 10.87\")  Wt 68.4 kg (150 lb 14.4 oz)  SpO2 95%  BMI 21.13 kg/m2  BMI= Body mass index is 21.13 kg/(m^2).   Temp (24hrs), Av  F (37.2  C), Min:97.1  F (36.2  C), Max:100.5  F (38.1  C)  Alert, NAD    A/P  Febrile illness with hepatic involvement. 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 Other liver disease serologically evaluated and no other test was indicative of primary liver disease.  Liver tests improved. Tbili typically lags behind.  On antibiotics and magen. Feeling better.  Planned follow up with ID. Plan for discharge per ID and primary team.  We will sign off. I am available for any questions inpatient or outpatient if her course does not improve.  Emily Sparrow MD  Hepatology Staff  [phone]    This was a 15 minute visit, over 50% counseling and coordination of care.     "

## 2017-07-31 NOTE — PLAN OF CARE
Problem: Goal Outcome Summary  Goal: Goal Outcome Summary  Outcome: No Change  Patient sleeping at intervals overnight. Supplemental oxygen restarted at 2-3L per nasal cannula, due to lower sats of 89-90% beginning of night. Sats improved with oxygen use. Patient cooperative, tolerating O2 cannula on. Intermittent dry cough. Lungs diminished throughout. Patient states sometimes she feels like she can't take a deep breath. Peripheral IV. IV fluid at TKO between meds. Antibiotic coverage with Cipro and Zosyn. IV. Mouth dry; using ice chips. Voiding dark cecile urine. Bili and LFT's elevated. Temp max 100.5 Temporal. Given Tylenol once. Diaphoretic afterward. Neuro checks intact. Up to bathroom independently.     Problem: Infection, Risk/Actual (Adult)  Goal: Identify Related Risk Factors and Signs and Symptoms  Related risk factors and signs and symptoms are identified upon initiation of Human Response Clinical Practice Guideline (CPG)   Outcome: No Change    07/31/17 0706   Infection, Risk/Actual   Infection, Risk/Actual: Related Risk Factors exposure to microbes   Signs and Symptoms (Infection, Risk/Actual) body temperature changes;diaphoresis;lab value changes

## 2017-07-31 NOTE — PROGRESS NOTES
"[facility], [place]   General Infectious Disease Progress Note     Patient:  Tameka Hebert, Date of birth 1945, Medical record number [id number]  Date of Visit:  July 25, 2017  Date of Admission: 7/25/2017  Consult Requester:Vanessa Cuello MD            Assessment and Recommendations:   ASSESSMENT:  1. Systemic Febrile illness with subacute ascending cholangitis / cholecystitis.  -- non-typhoidal salmonella infection would be a possible unifying diagnosis, and one does not need diarrhea to have salmonellosis.  She did have initial abdominal prodrome prior to systemic fevers.  -- differential diagnosis would include other enteric pathogens (e.g. Listeria) but salmonella causes the most biliary pathology.    -- CRP has been downtrending since antibiotics were started. 77 mg/L at admit ->30 mg/L now today    2. Cholestasis -- expect this to lag.  T-bili improved today. Other hepatic enzymes have marked improved since OSH ER visit on 7/24.        RECOMMENDATION:  1. Okay with discharge  2. cipro 500mg PO BID to complete 14 day course  3. Ursodiol x 1 week  4. Follow up LFTs, Creatinine, CRP on Thur. I can check these labs.    Colton Liu [phone]    Consult Question: fever of unknown origin  Admission Diagnosis: fever   elevated billirubin  Fever         History of Present Illness:     Ms. Hebert is a 72-year-old female with an unremarkable PMH who was admitted to [facility] on 7/25 at the behest of her PCP who noted absolute lymphopenia on CBC. On 7/15 shortly after retuning from a hiking trip in [place] from 7/8-7/15, she experienced occipital HAs, increased fatigue, chills, and fevers (Tmax 102) that occured every 4-6 hours \"like clockwork.\" Moreover, she reported myalgias of the lower extremities bilaterally, neck, and shoulders. Of note, she visited [place] 6/9-6/23 where she removed ticks from others but denies getting bitten herself. She denies other insect bites, rash, sick " contacts, and eating undercooked meats. Moreover, she denied any diarrhea, hematochezia, abdominal pain, cough, CP, SOB, jaw pain/fatigue, transient vision loss.  On 7/28, she stated that she did have some abdominal loose stool and nausea prior to the onset of fever.     Initial infectious work-up including Anaplasmosis, Ehrlichiosis, Babesiosis, EBV, CMV, HIV, West Nile Virus, HAV, HBV, HCV, acute Parvovirus B19 infection has been negative thus far. Moreover, recent CXR was negative and MRI of abdomen was negative for abscess and/or malignancy. Blood cultures thus far have shown no growth to date. Furthermore, she received Doxycycline from 7/20-7/26 without any resolution of her symptoms. History of intermittent rigors is suggestive of possible/probable intermittent bacteremia. An elevated CRP of 54 mg/L is trending downward (69->60->54 mg/L on cipro) and mildly elevated procalcitonin 0.64 ng/mL -> 0.56 -> 0.47 ng/mL is also improving. This pattern is consistent with a bacterial process and is not compatible with viral etiology.     Subacute cholecystitis most likely at this time due to elevated LFTs and hyperbilirubinemia in conjunction with 7/26 MR-abdomen findings concerning for a subacute, non-obstructed ascending cholangitis. Physical exam is not consistent with acute cholescystitis; however, MRI findings are clearly abnormal (as are LFTs), thus targeting enteric sammi is reasonable. Salmonella could be a unifying diagnosis.    On 7/20, her PCP started Doxycycline for 10 days for presumed tick-borne illness. She was negative for Lyme, Anaplasma and Ehrlichia at the time. However, she was noted to have elevated LFTs (, , , Tbili 0.6) at the time. The following day, she visited the [facility] due to an episode of non-bilious, non-bloody emesis. In the ED, workup for Lyme, hep A, B, C, anaplasma, erlichia, CMV, and EBV were negative. An AB-US was performed which was unremarkable. Patient  was discharged to home with recommendations to maintain hydration, continue ibuprofen for cyclical fevers, and continue doxycycline course. Because the patient felt worse, she returned to her PCP on 7/24, where she was noted to have elevated liver enzymes (, , alk phos 717, t.bili 2.5 and direct bili 2.0). Due to cyclic fevers with chills and sweats in addition to her elevated liver enzymes, she was admitted to [facility]. Of note, she denies having pets at home, living [place] or [place] a farm, taking any outpatient medications.    Since admission, the patient has been intermittently febrile (Tmax 100.7 on 7/25) and normotensive. CXR on 7/25 was negative for infiltrates, consolidation, acute processes. 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 7/26 HIDA scan showed no evidence of acute cholecystitis and poor washout of the radiotracer from the liver consistent with hepatocellular dysfunction. 7/27 RUQ US shows circumferential gallbladder wall thickening with cholelithiasis, and no evidence of acute cholecystitis based on earlier HIDA scan.      7/26 Blood culture X1 and parasite stain negative. Initial infectious work-up including Anaplasmosis, Ehrlichiosis, Babesiosis, EBV, CMV, HIV, West Nile Virus, HAV, HBV, HCV, acute Parvovirus B19 infection has been negative thus far. Moreover, the patient has been found to be negative for C. burnetti antibodies, F Tularensis antibodies, and Rickettsia antibodies. BETH was positive at a low titer: 1:40. The On 7/26 doxycycline was discontinued due to negative tickborne disease work-up and symptom persistence despite 7 days of therapy. On 7/26, IV ciprofloxacin was started. Tmax 102.5 this morning. CRP is trending  "down from 77 on admission to 54 today. Procalcitonin also trending downward 0.47 - 0.56 - 0.64. The patient's Tbili is trending up 4.4 - 3.6 - 3.1 - 2.8 (direct 3.5). ALT, AST, and ALP trending down.    This morning the patient reports feeling \"no different that yesterday, if anything worse.\" She reports chills and decreased PO intake: took a few bites of a hamburger last night. Moreover, she reports feeling like she \"cannot take a good breathe\" associated with \"a fullness in the chest.\" She denies vomiting, diarrhea, chest pain.     Temp (24hrs), Av.6  F (37.6  C), Min:97  F (36.1  C), Max:102.3  F (39.1  C)      Her WC is 6.8, CRP continue to trend downward 41 <- 50 <- 54 - 60 - 69 - 77, Procalc also trending down 0.38 <- 0.41 <- 0.47 - 0.56 - 0.64   Tbili is trending up 4.4 - 3.6 - 3.1 - 2.8 (direct 3.5). ALT, AST, and ALP trending down. (AST is up from 47 (almost normal) to 77 IU/mL on ).               Review of Systems:   CONSTITUTIONAL:  Positive for fevers, chills, and diaphoresis  EYES: negative for icterus  ENT:  negative for hearing loss, tinnitus and sore throat  RESPIRATORY:  negative for cough with sputum and dyspnea  CARDIOVASCULAR:  negative for chest pain, dyspnea  GASTROINTESTINAL:  Negative for nausea and vomiting (today). negative for diarrhea and constipation. Appetite remains poor.  GENITOURINARY:  negative for dysuria  HEME:  No easy bruising  INTEGUMENT:  negative for rash and pruritus  NEURO:  Negative for headache           Current Medications (antimicrobials listed in bold):       ursodiol  250 mg Oral BID     piperacillin-tazobactam  3.375 g Intravenous Q6H     ciprofloxacin  400 mg Intravenous Q12H     sodium chloride (PF)  3 mL Intracatheter Q8H            Allergies:     Allergies   Allergen Reactions     Codeine Nausea and Vomiting            Physical Exam:   Vitals were reviewed  Patient Vitals for the past 24 hrs:   BP Temp Temp src Pulse Heart Rate Resp SpO2 Weight   17 " 1135 - - - - - - 98 % -   07/31/17 0810 118/73 97.1  F (36.2  C) Temporal - 85 18 95 % 68.4 kg (150 lb 14.4 oz)   07/31/17 0405 - - - - - - 95 % -   07/31/17 0400 128/72 100.5  F (38.1  C) Temporal - 84 18 91 % -   07/31/17 0030 - - - - 86 - 93 % -   07/31/17 0022 128/76 98.7  F (37.1  C) Temporal - 84 16 (!) 89 % -   07/30/17 2009 125/75 99.3  F (37.4  C) Temporal - 85 16 94 % -   07/30/17 1534 116/74 98.6  F (37  C) Temporal - 84 18 93 % -   07/30/17 1329 116/74 99.7  F (37.6  C) Temporal 84 84 18 90 % -   07/30/17 1226 - 101  F (38.3  C) Temporal - - - 92 % -     Ranges for his vital signs:  Temp:  [97.1  F (36.2  C)-101  F (38.3  C)] 97.1  F (36.2  C)  Pulse:  [84] 84  Heart Rate:  [84-86] 85  Resp:  [16-18] 18  BP: (116-128)/(72-76) 118/73  SpO2:  [89 %-98 %] 98 %    Physical Examination:  GENERAL:  well-developed, well-nourished, in bed in no acute distress.  HEENT:  Head is normocephalic, atraumatic   EYES:  Eyes have mildly icteric sclerae without conjunctival injection or stigmata of endocarditis.    SKIN:  No acute rashes. No stigmata of endocarditis.  jaundice.   NEUROLOGIC:  Ambulatory with normal gait.         Laboratory Data:     CRP Inflammation   Date Value Ref Range Status   07/31/2017 30.0 (H) 0.0 - 8.0 mg/L Final   07/30/2017 41.0 (H) 0.0 - 8.0 mg/L Final   07/29/2017 50.0 (H) 0.0 - 8.0 mg/L Final   07/28/2017 54.0 (H) 0.0 - 8.0 mg/L Final   07/27/2017 60.0 (H) 0.0 - 8.0 mg/L Final     Creatinine   Date Value Ref Range Status   07/31/2017 0.51 (L) 0.52 - 1.04 mg/dL Final   07/30/2017 0.49 (L) 0.52 - 1.04 mg/dL Final   07/29/2017 0.46 (L) 0.52 - 1.04 mg/dL Final   07/28/2017 0.43 (L) 0.52 - 1.04 mg/dL Final   07/27/2017 0.51 (L) 0.52 - 1.04 mg/dL Final     WBC   Date Value Ref Range Status   07/31/2017 6.5 4.0 - 11.0 10e9/L Final   07/30/2017 7.6 4.0 - 11.0 10e9/L Final   07/29/2017 6.1 4.0 - 11.0 10e9/L Final   07/28/2017 6.8 4.0 - 11.0 10e9/L Final   07/27/2017 6.4 4.0 - 11.0 10e9/L Final      Hemoglobin   Date Value Ref Range Status   07/31/2017 10.4 (L) 11.7 - 15.7 g/dL Final     MCV   Date Value Ref Range Status   07/31/2017 85 78 - 100 fl Final     Platelet Count   Date Value Ref Range Status   07/31/2017 531 (H) 150 - 450 10e9/L Final     Sed Rate   Date Value Ref Range Status   07/26/2017 27 0 - 30 mm/h Final     ALT   Date Value Ref Range Status   07/31/2017 152 (H) 0 - 50 U/L Final   07/30/2017 162 (H) 0 - 50 U/L Final   07/29/2017 166 (H) 0 - 50 U/L Final   07/28/2017 207 (H) 0 - 50 U/L Final   07/27/2017 262 (H) 0 - 50 U/L Final     AST   Date Value Ref Range Status   07/31/2017 92 (H) 0 - 45 U/L Final   07/30/2017 77 (H) 0 - 45 U/L Final   07/29/2017 47 (H) 0 - 45 U/L Final     Bilirubin Total   Date Value Ref Range Status   07/31/2017 4.6 (H) 0.2 - 1.3 mg/dL Final   07/30/2017 5.2 (H) 0.2 - 1.3 mg/dL Final   07/29/2017 4.9 (H) 0.2 - 1.3 mg/dL Final     EBV Qualitative PCR   Date Value Ref Range Status   07/27/2017   Final    Not Detected  (Note)  NOT DETECTED - A negative result does not rule out the  presence of PCR inhibitors in the patient specimen or assay  specific nucleic acid in concentrations below the level of  detection by the assay.  INTERPRETIVE INFORMATION:  Jessica Barr Virus by PCR  Test developed and characteristics determined by [facility]. See Compliance Statement A: [facility]  Performed by [facility],  [address] [place] [id number] [phone]  [email or web address] Omar Ramos MD, Lab. Director       Lab Results   Component Value Date     07/31/2017    BUN 5 (L) 07/31/2017       Attending Physician Attestation:  I have seen and evaluated Tameka Hebert. I have discussed with the house staff team or resident(s), and I agree with the above documented findings and plan in this note. I have reviewed today's vital signs, medications, labs and imaging.  If a medical student was involved in this note, they were serving in a capacity as a scribe.  Floor  time: 30 minutes  Face-to-face time: 30 minutes  Total time: 60 minutes     Colton Liu MD MPH  [facility] Distinguished   Division of Infectious Diseases & International Medicine  Department of Medicine

## 2017-07-31 NOTE — PLAN OF CARE
Problem: Individualization  Goal: Patient Preferences  Outcome: Improving  Patient feeling better, does still have low grade fevers. Did the walk test and patient maintain her oxygen sats throughout activity. Felt she was going to have a temp spike this afternoon, requested her temperature checked. It was 100.3 temporal, given tylenol 650 mg per her request. Plan is to discharge later today, patient will go out on oral ciprofloxacin.

## 2017-08-01 LAB
BACTERIA SPEC CULT: NO GROWTH
MICRO REPORT STATUS: NORMAL
SPECIMEN SOURCE: NORMAL

## 2017-08-02 LAB
B HENSELAE DNA BLD QL NAA+PROBE: NORMAL
INTERPRETATION ECG - MUSE: NORMAL
SPECIMEN SOURCE: NORMAL

## 2017-08-03 ENCOUNTER — HOSPITAL ENCOUNTER (OUTPATIENT)
Dept: LAB | Facility: CLINIC | Age: 72
Discharge: HOME | End: 2017-08-03
Admitting: INTERNAL MEDICINE
Payer: MEDICARE

## 2017-08-03 DIAGNOSIS — R50.9 FEVER, UNSPECIFIED FEVER CAUSE: ICD-10-CM

## 2017-08-03 LAB
ALBUMIN SERPL-MCNC: 2.6 G/DL (ref 3.4–5)
ALP SERPL-CCNC: 478 U/L (ref 40–150)
ALT SERPL W P-5'-P-CCNC: 112 U/L (ref 0–50)
ANION GAP SERPL CALCULATED.3IONS-SCNC: 5 MMOL/L (ref 3–14)
AST SERPL W P-5'-P-CCNC: 46 U/L (ref 0–45)
B HENSELAE IGG TITR SER IF: NORMAL {TITER}
B HENSELAE IGM TITR SER IF: NORMAL {TITER}
BACTERIA SPEC CULT: NO GROWTH
BILIRUB SERPL-MCNC: 2.9 MG/DL (ref 0.2–1.3)
BRUCELLA AB TITR SER AGGL: NORMAL {TITER}
BUN SERPL-MCNC: 11 MG/DL (ref 7–30)
CALCIUM SERPL-MCNC: 10.4 MG/DL (ref 8.5–10.1)
CHLORIDE SERPL-SCNC: 96 MMOL/L (ref 94–109)
CO2 SERPL-SCNC: 32 MMOL/L (ref 20–32)
CREAT SERPL-MCNC: 0.65 MG/DL (ref 0.52–1.04)
CRP SERPL-MCNC: 15.1 MG/L (ref 0–8)
GFR SERPL CREATININE-BSD FRML MDRD: 89 ML/MIN/1.7M2
GLUCOSE SERPL-MCNC: 100 MG/DL (ref 70–99)
MICRO REPORT STATUS: NORMAL
POTASSIUM SERPL-SCNC: 3.7 MMOL/L (ref 3.4–5.3)
PROT SERPL-MCNC: 7.3 G/DL (ref 6.8–8.8)
SODIUM SERPL-SCNC: 133 MMOL/L (ref 133–144)
SPECIMEN SOURCE: NORMAL

## 2017-08-03 PROCEDURE — 80053 COMPREHEN METABOLIC PANEL: CPT | Performed by: INTERNAL MEDICINE

## 2017-08-03 PROCEDURE — 36415 COLL VENOUS BLD VENIPUNCTURE: CPT | Performed by: INTERNAL MEDICINE

## 2017-08-03 PROCEDURE — 86140 C-REACTIVE PROTEIN: CPT | Performed by: INTERNAL MEDICINE

## 2017-08-04 ENCOUNTER — NURSE TRIAGE (OUTPATIENT)
Dept: NURSING | Facility: CLINIC | Age: 72
End: 2017-08-04

## 2017-08-05 LAB
BACTERIA SPEC CULT: NO GROWTH
MICRO REPORT STATUS: NORMAL
SPECIMEN SOURCE: NORMAL

## 2017-09-20 LAB
EBV DNA SPEC QL NAA+PROBE: NOT DETECTED
SPECIMEN SOURCE: NORMAL

## 2019-10-02 ENCOUNTER — HEALTH MAINTENANCE LETTER (OUTPATIENT)
Age: 74
End: 2019-10-02

## 2019-10-30 ENCOUNTER — HEALTH MAINTENANCE LETTER (OUTPATIENT)
Age: 74
End: 2019-10-30

## 2019-12-16 ENCOUNTER — HEALTH MAINTENANCE LETTER (OUTPATIENT)
Age: 74
End: 2019-12-16

## 2021-01-15 ENCOUNTER — HEALTH MAINTENANCE LETTER (OUTPATIENT)
Age: 76
End: 2021-01-15

## 2021-07-07 ENCOUNTER — TELEPHONE (OUTPATIENT)
Dept: OTOLARYNGOLOGY | Facility: CLINIC | Age: 76
End: 2021-07-07

## 2021-07-09 ENCOUNTER — TELEPHONE (OUTPATIENT)
Dept: OTOLARYNGOLOGY | Facility: CLINIC | Age: 76
End: 2021-07-09

## 2021-07-09 NOTE — TELEPHONE ENCOUNTER
FUTURE VISIT INFORMATION      FUTURE VISIT INFORMATION:    Date: 8/10/2021    Time: 9:10AM    Location: [facility]  REFERRAL INFORMATION:    Referring provider:      Referring providers clinic:      Reason for visit/diagnosis  Vertigo- Self referred. JAE Guerrero     RECORDS REQUESTED FROM:       Clinic name Comments Records Status Imaging Status   [facility]  8/2/2021, 5/16/2016, 4/14/2016 notes from Dr Anthony Rodriguez   6/26/2020 note from Dr Dav Jensen  8/2/2021 Audiogram   6/26/2020 audiogram   5/16/2016 audiogram   4/14/2016 Audiogram    Emailed ESMER 7/9/21, 7/20/21 - req 8/4/21 - received 8/5/21    [facility] MRN: [id number] 4/15/2016 MR Brain IAC    req 8/5/21 - PACS  7/22/2021 CT Temporal Bone  Sent to scan 7/9/21 req 7/9/21 - PACS   [facility]  07/01/2021 note from Fernandez Buck PT   Care everywhere     [facility]   6/29/2021 note from Yinka Arreaga MD Care everywhere     [facility]  8/12/2020 MR Head Brain IAC  Care everywhere  req 7/9/21 - PACS           7/9/2021 11:19AM sent a fax to [facility] for images and emailed ESMER to patient [email or web address] for [facility] recs, sent a fax to [facility] for images - Amay   7/20/2021 10:05AM images received in PACS, unable to reach patient at both numbers listed in chart. Will try to reach out to patient again. Re-emailed patient ESMER, if not hear from back from patient, an ESMER will be mailed to her on Thursday 7/22 - Amay   *4:05PM received an email from patient confirming she received the email, patient will be sending signed ESMER shortly- Amay   8/4/2021 11:33AM sent a fax to [facility] for recs - Amay   8/5/2021 11:08AM received recs from [facility], patient completed a CT at [facility] in July, sent a fax to [facility] for images - Amay   8/6/2021 7:31AM images received in PACS - amay

## 2021-07-09 NOTE — TELEPHONE ENCOUNTER
1. Have you noticed any changes in hearing? No  2. Do you have ringing, buzzing, or other sounds in your ears or head, this is also referred to as Tinnitus? Sometimes: sometimes  3. When and where was your last hearing test? 2+ years ago Dr. Rodriguez ENT  4. Do you feel lightheaded or foggy? Yes  5. Do you have a spinning sensation? Yes  6. Is there any specific position that can bring on dizziness? random  7. Does looking up cause dizziness? Sometimes: doesnt know. Tries not to do it  8. Does getting in and our of bed cause dizziness? Yes  9. Does turning over in bed increase or cause dizziness? Sometimes: normally not.  10. Does bending over cause dizziness? No  11. Is there anything that you can do to prevent the dizziness? Run its course  12. Has the dizziness gotten better with time? Yes  13. Have you seen Physical Therapy for dizziness? (Please indicate clinic and as much of the location as possible): Yes, If yes, where? [facility] if yes, who?   14. Are you being referred to a specific physician? Yes: Cesar  15. Have you been evaluated/treated for your dizziness at any other location?  (If yes,obtian as much clinic/provider/locaiton as possible) Yes. (If yes answer the following questions:)   Have you seen any ENT, Neurology, or other providers for these symptoms?             Yes, If yes, where? [facility] Weiss if yes, who?    Have you had any balance or Audiology testing? No Have you had an MRI or CT scan of your head or neck? Yes, If yes, where? [facility] if yes, who?     Would you like to receive your Release of Information by mail or e-mail?  e-mail

## 2021-07-22 ENCOUNTER — TRANSFERRED RECORDS (OUTPATIENT)
Dept: HEALTH INFORMATION MANAGEMENT | Facility: CLINIC | Age: 76
End: 2021-07-22

## 2021-07-22 NOTE — TELEPHONE ENCOUNTER
Called and left patient a voicemail with my direct number for call back. Received email correspondence from patient stating ESMER was going to be sent yesterday 7/21. Did not receive ESMER, left patient a message to see if she can send the ESMER today.     Amay

## 2021-07-29 DIAGNOSIS — Z01.10 ENCOUNTER FOR HEARING TEST: Primary | ICD-10-CM

## 2021-08-02 ENCOUNTER — TRANSFERRED RECORDS (OUTPATIENT)
Dept: HEALTH INFORMATION MANAGEMENT | Facility: CLINIC | Age: 76
End: 2021-08-02

## 2021-08-03 NOTE — TELEPHONE ENCOUNTER
Left patient a message to see if she can email me the ESMER sent to her 2x via email so that we can obtain her [facility] recs prior to her 8/10 consult. Left my direct number for call back [phone] (ok to [facility]).     Amay

## 2021-08-04 NOTE — TELEPHONE ENCOUNTER
Signed ESMER received via email today, sent a req to \[facility]\"" for recs - Amay

## 2021-08-10 ENCOUNTER — PRE VISIT (OUTPATIENT)
Dept: OTOLARYNGOLOGY | Facility: CLINIC | Age: 76
End: 2021-08-10

## 2021-08-10 ENCOUNTER — OFFICE VISIT (OUTPATIENT)
Dept: AUDIOLOGY | Facility: CLINIC | Age: 76
End: 2021-08-10
Attending: OTOLARYNGOLOGY
Payer: MEDICARE

## 2021-08-10 ENCOUNTER — OFFICE VISIT (OUTPATIENT)
Dept: OTOLARYNGOLOGY | Facility: CLINIC | Age: 76
End: 2021-08-10
Payer: MEDICARE

## 2021-08-10 VITALS
OXYGEN SATURATION: 99 % | WEIGHT: 149.91 LBS | BODY MASS INDEX: 20.99 KG/M2 | HEART RATE: 69 BPM | TEMPERATURE: 98.2 F | HEIGHT: 71 IN

## 2021-08-10 DIAGNOSIS — H80.90 OTOSCLEROSIS, UNSPECIFIED LATERALITY: Primary | ICD-10-CM

## 2021-08-10 DIAGNOSIS — Z01.10 ENCOUNTER FOR HEARING TEST: ICD-10-CM

## 2021-08-10 DIAGNOSIS — G43.909 MIGRAINE WITHOUT STATUS MIGRAINOSUS, NOT INTRACTABLE, UNSPECIFIED MIGRAINE TYPE: ICD-10-CM

## 2021-08-10 DIAGNOSIS — H90.3 SENSORINEURAL HEARING LOSS, BILATERAL: Primary | ICD-10-CM

## 2021-08-10 DIAGNOSIS — R42 VERTIGO: ICD-10-CM

## 2021-08-10 PROCEDURE — 99203 OFFICE O/P NEW LOW 30 MIN: CPT | Mod: GC | Performed by: OTOLARYNGOLOGY

## 2021-08-10 PROCEDURE — 92557 COMPREHENSIVE HEARING TEST: CPT | Performed by: AUDIOLOGIST

## 2021-08-10 PROCEDURE — 92550 TYMPANOMETRY & REFLEX THRESH: CPT | Performed by: AUDIOLOGIST

## 2021-08-10 RX ORDER — MAGNESIUM 200 MG
25 TABLET ORAL
COMMUNITY

## 2021-08-10 ASSESSMENT — MIFFLIN-ST. JEOR: SCORE: 1271.13

## 2021-08-10 ASSESSMENT — PAIN SCALES - GENERAL: PAINLEVEL: NO PAIN (0)

## 2021-08-10 NOTE — PROGRESS NOTES
AUDIOLOGY REPORT    SUMMARY: Audiology visit completed. See audiogram for results.      RECOMMENDATIONS: Follow-up with ENT.    Morgan Guy, [facility] [place]  Licensed Audiologist  [place] License #[id number]

## 2021-08-10 NOTE — NURSING NOTE
"Chief Complaint   Patient presents with     Consult     vertigo      Pulse 69, temperature 98.2  F (36.8  C), height 1.803 m (5' 11\"), weight 68 kg (149 lb 14.6 oz), SpO2 99 %.    Sorin Medrano LPN    "

## 2021-08-10 NOTE — PROGRESS NOTES
Neurotology Clinic      Name: Tameka Hebert  MRN: [id number]  Age: 76 year old  : 1945  Referring provider: Referred Self  08/10/2021      Chief Complaint:   Dizziness     History of Present Illness:   Tameka Hebert is a 76 year old female who presents for consultation.  Consultation was requested by Referred Self. The patient has been following [facility] in [place] and visited an [facility] one year ago (08/10/2020) for room-spinning dizziness, nausea, and vomiting annually to once every few years. She was thought to have BPPV and was prescribed Zofran prn and scheduled an MRI. 10 days ago (2021),the patient reported having 4 episodes of vertigo, nausea, vomiting, and fatigue that lasted 3 hours each, and was referred her by [facility]. She added that she wasn't sure if this was triggered positionally or not. The patient reported some improvement in meclizine. She reported improved dizziness and balance while on a course of steroids. At this visit, the patient was diagnosed with cochlear facial dehiscence, and was started on Zofran and Valium prn, and recommended Meniere's management.    Today, she reports about a 20 year history of episodic vertigo that she describes as feeling as though she is free-falling, everything is spinning, and imbalanced. It is associated with severe nausea, vomiting and overall ill feeling. These symptoms last a few hours at a time. Since May it has been happening more frequently, a few episodes during that time. Prior to that it happened about once or twice a year. There was one time it happened when she reached to grab something from her right seat but otherwise hasn't noticed any association with turning her head. No concomitant headaches, vision changes, tinnitus, aural fullness, or hearing loss. She will take meclizine and zofran when these episodes occur but really feels time is the only thing that helps her symptoms. She has not tried the valium she was prescribed.  She does not recall a history of migraines or frequent headaches, nor does she have a family history of them (though her son gets migraines). She does feel like the episodes may come on more with stress or fatigue. In addition, she recalls episodes of flashes of light that are not associated with any headache but do happen sporadically a few times a year.     She also notes a very short history of vertigo induced by rolling over in bed or sitting up. She was told she had BPPV.      Review of Systems:   Pertinent items are noted in HPI or as in patient entered ROS below, remainder of complete ROS is negative.    ENT ROS 8/10/2021   Constitutional Problems with sleep   Neurology Dizzy spells, Headache   Ears, Nose, Throat Ringing/noise in ears   Cardiopulmonary Cough     Active Medications:     Current Outpatient Medications:      cholecalciferol (VITAMIN D3) 25 mcg (1000 units) capsule, 1000 iu daily in summer, 2000 IU daily in winter, Disp: , Rfl:      magnesium 200 MG TABS, Take 25 mg by mouth, Disp: , Rfl:      potassium chloride (KLOR-CON) 20 MEQ Packet, Take 20 mEq by mouth daily, Disp: 5 tablet, Rfl: 0     Turmeric Curcumin 500 MG CAPS, Take 1,000 mg by mouth, Disp: , Rfl:       Allergies:   Codeine      Past Medical History:  Past Medical History:   Diagnosis Date     Fever 07/25/2017     Osteopenia      Vertigo      Patient Active Problem List   Diagnosis     Fever      Past Surgical History:  Past Surgical History:   Procedure Laterality Date     [place] UNLISTED PROCEDURE LARYNX  1980    nodules taken off     SHOULDER OPEN ROTATOR CUFF REPAIR  09/29/2016    Dr Aguila     TUBAL LIGATION       Family History:   No family history on file.      Social History:   Social History     Tobacco Use     Smoking status: Never Smoker     Smokeless tobacco: Never Used   Substance Use Topics     Alcohol use: Yes     Alcohol/week: 7.0 standard drinks     Types: 7 Standard drinks or equivalent per week     Drug use: None     "  Physical Exam:   Pulse 69   Temp 98.2  F (36.8  C)   Ht 1.803 m (5' 11\")   Wt 68 kg (149 lb 14.6 oz)   SpO2 99%   BMI 20.91 kg/m       Constitutional:  The patient was unaccompanied, well-groomed, and in no acute distress.     Skin: Normal:  warm and pink without rash   Neurologic: Alert and oriented x 3. HB I/VI. No nystagmus. CN's III-XII within normal limits.  Voice normal.    Psychiatric: The patient's affect was calm, cooperative, and appropriate.     Communication:  Normal; communicates verbally, normal voice quality.   Respiratory: Breathing comfortably without stridor or exertion of accessory muscles.    Eyes: Pupils were equal and reactive.  Extraocular movement intact.     Ears: Pinnae and tragus non-tender.  EAC's and TM's were evaluated, results below.        Audiogram:  AUDIOGRAM: She underwent an audiogram today.   Audiology reported that this demonstrated:  Normal to mild SNHL right ear. Mild to moderate SNHL left ear. Normal eardrum mobility bilaterally. Present ipsi reflexes, elevated contra.        Right: Speech reception threshold is 25 dB with 100% word recognition at 65 dB. Tympanogram A type   Left: Speech reception threshold is 25 dB with 100% word recognition at 65 dB. Tympanogram A type     Audiogram was independently reviewed.    Imaging:  CT Temporal Bones Without Contrast:  (07/22/2021)  Interpretation:  Visualized intracranial structures demonstrate no mass or hemorrhage. Visualized globes and orbits are unremarkable. Visualized paranasal sinuses are clear.    Conclusion: Bilateral facial cochlear dehiscence.    Outside records from [facility] were independently reviewed. Unclear if identified sites of dehiscence between facial nerve and cochlea are true dehiscence or if the bone is just thin.      Assessment and Plan:  Tameka Hebert is a 76 year old female who presents for consultation regarding episodic vertigo in the setting of possible dehiscence between " the facial nerve and cochlea. Upon review of the image and discussion with the patient there is low suspicion that this is the etiology of her symptoms. The onset of her symptoms later in life would not be consistent with the likely congenital findings noted on CT. Her imaging does not indicate other anatomical findings that may be causing these episodes however, her presentation is suspicious for migrainous vertigo. We discussed this possible diagnosis as well as the lack of a confirmatory test. She would like to proceed with the conservative migraine treatments (eg. Elimination diet) and monitor her symptoms over the coming months. Additionally, we would like to rule out any vertebral artery anomaly.     - MRA/MRV to rule out vascular pathology   - Elimination diet     Follow-up: 2-3 months    Nga Villanueva MD   ENT PGY2    Inez Guerrero MD  Otology & Neurotology  [facility]        Scribe Preparation Attestation:  Jasmin HALL, a scribe, prepared the chart for today's encounter.      Inez HALL MD, saw this patient with the resident/fellow and agree with the resident s findings and plan of care as documented in the resident s/fellow s note. I was present for the entire procedure.

## 2021-08-10 NOTE — LETTER
8/10/2021       RE: Tameka Hebert  [address]  [place] [address]     Dear Colleague,    Thank you for referring your patient, Tameka Hebert, to the [facility] [place] at [facility]. Please see a copy of my visit note below.      Neurotology Clinic      Name: Tameka Hebert  MRN: [id number]  Age: 76 year old  : 1945  Referring provider: Referred Self  08/10/2021      Chief Complaint:   Dizziness     History of Present Illness:   Tameka Hebert is a 76 year old female who presents for consultation.  Consultation was requested by Referred Self. The patient has been following [facility] in [place] and visited an [facility] one year ago (08/10/2020) for room-spinning dizziness, nausea, and vomiting annually to once every few years. She was thought to have BPPV and was prescribed Zofran prn and scheduled an MRI. 10 days ago (2021),the patient reported having 4 episodes of vertigo, nausea, vomiting, and fatigue that lasted 3 hours each, and was referred her by [facility]. She added that she wasn't sure if this was triggered positionally or not. The patient reported some improvement in meclizine. She reported improved dizziness and balance while on a course of steroids. At this visit, the patient was diagnosed with cochlear facial dehiscence, and was started on Zofran and Valium prn, and recommended Meniere's management.    Today, she reports about a 20 year history of episodic vertigo that she describes as feeling as though she is free-falling, everything is spinning, and imbalanced. It is associated with severe nausea, vomiting and overall ill feeling. These symptoms last a few hours at a time. Since May it has been happening more frequently, a few episodes during that time. Prior to that it happened about once or twice a year. There was one time it happened when she reached to grab something from her right seat but  otherwise hasn't noticed any association with turning her head. No concomitant headaches, vision changes, tinnitus, aural fullness, or hearing loss. She will take meclizine and zofran when these episodes occur but really feels time is the only thing that helps her symptoms. She has not tried the valium she was prescribed. She does not recall a history of migraines or frequent headaches, nor does she have a family history of them (though her son gets migraines). She does feel like the episodes may come on more with stress or fatigue. In addition, she recalls episodes of flashes of light that are not associated with any headache but do happen sporadically a few times a year.     She also notes a very short history of vertigo induced by rolling over in bed or sitting up. She was told she had BPPV.      Review of Systems:   Pertinent items are noted in HPI or as in patient entered ROS below, remainder of complete ROS is negative.    ENT ROS 8/10/2021   Constitutional Problems with sleep   Neurology Dizzy spells, Headache   Ears, Nose, Throat Ringing/noise in ears   Cardiopulmonary Cough     Active Medications:     Current Outpatient Medications:      cholecalciferol (VITAMIN D3) 25 mcg (1000 units) capsule, 1000 iu daily in summer, 2000 IU daily in winter, Disp: , Rfl:      magnesium 200 MG TABS, Take 25 mg by mouth, Disp: , Rfl:      potassium chloride (KLOR-CON) 20 MEQ Packet, Take 20 mEq by mouth daily, Disp: 5 tablet, Rfl: 0     Turmeric Curcumin 500 MG CAPS, Take 1,000 mg by mouth, Disp: , Rfl:       Allergies:   Codeine      Past Medical History:  Past Medical History:   Diagnosis Date     Fever 07/25/2017     Osteopenia      Vertigo      Patient Active Problem List   Diagnosis     Fever      Past Surgical History:  Past Surgical History:   Procedure Laterality Date     [place] UNLISTED PROCEDURE LARYNX  1980    nodules taken off     SHOULDER OPEN ROTATOR CUFF REPAIR  09/29/2016    Dr Aguila     TUBAL LIGATION    "    Family History:   No family history on file.      Social History:   Social History     Tobacco Use     Smoking status: Never Smoker     Smokeless tobacco: Never Used   Substance Use Topics     Alcohol use: Yes     Alcohol/week: 7.0 standard drinks     Types: 7 Standard drinks or equivalent per week     Drug use: None      Physical Exam:   Pulse 69   Temp 98.2  F (36.8  C)   Ht 1.803 m (5' 11\")   Wt 68 kg (149 lb 14.6 oz)   SpO2 99%   BMI 20.91 kg/m       Constitutional:  The patient was unaccompanied, well-groomed, and in no acute distress.     Skin: Normal:  warm and pink without rash   Neurologic: Alert and oriented x 3. HB I/VI. No nystagmus. CN's III-XII within normal limits.  Voice normal.    Psychiatric: The patient's affect was calm, cooperative, and appropriate.     Communication:  Normal; communicates verbally, normal voice quality.   Respiratory: Breathing comfortably without stridor or exertion of accessory muscles.    Eyes: Pupils were equal and reactive.  Extraocular movement intact.     Ears: Pinnae and tragus non-tender.  EAC's and TM's were evaluated, results below.        Audiogram:  AUDIOGRAM: She underwent an audiogram today.   Audiology reported that this demonstrated:  Normal to mild SNHL right ear. Mild to moderate SNHL left ear. Normal eardrum mobility bilaterally. Present ipsi reflexes, elevated contra.        Right: Speech reception threshold is 25 dB with 100% word recognition at 65 dB. Tympanogram A type   Left: Speech reception threshold is 25 dB with 100% word recognition at 65 dB. Tympanogram A type     Audiogram was independently reviewed.    Imaging:  CT Temporal Bones Without Contrast:  (07/22/2021)  Interpretation:  Visualized intracranial structures demonstrate no mass or hemorrhage. Visualized globes and orbits are unremarkable. Visualized paranasal sinuses are clear.    Conclusion: Bilateral facial cochlear dehiscence.    Outside records from [facility] were independently reviewed. Unclear if identified sites of dehiscence between facial nerve and cochlea are true dehiscence or if the bone is just thin.      Assessment and Plan:  Tameka Hebert is a 76 year old female who presents for consultation regarding episodic vertigo in the setting of possible dehiscence between the facial nerve and cochlea. Upon review of the image and discussion with the patient there is low suspicion that this is the etiology of her symptoms. The onset of her symptoms later in life would not be consistent with the likely congenital findings noted on CT. Her imaging does not indicate other anatomical findings that may be causing these episodes however, her presentation is suspicious for migrainous vertigo. We discussed this possible diagnosis as well as the lack of a confirmatory test. She would like to proceed with the conservative migraine treatments (eg. Elimination diet) and monitor her symptoms over the coming months. Additionally, we would like to rule out any vertebral artery anomaly.     - MRA/MRV to rule out vascular pathology   - Elimination diet     Follow-up: 2-3 months    Nga Villanueva MD   ENT PGY2    Inez Guerrero MD  Otology & Neurotology  [facility]        Scribe Preparation Attestation:  Jasmin HALL, a scribe, prepared the chart for today's encounter.      I, Inez Guerrero MD, saw this patient with the resident/fellow and agree with the resident s findings and plan of care as documented in the resident s/fellow s note. I was present for the entire procedure.        Again, thank you for allowing me to participate in the care of your patient.      Sincerely,    Inez Guerrero MD

## 2021-08-10 NOTE — PATIENT INSTRUCTIONS
1. You were seen in the ENT Clinic today by Dr. Guerrero. If you have any questions or concerns after your appointment, please call   - Option 1: ENT Clinic: [phone]  - Option 2: Muriel (Dr. Guerrero' Nurse): [phone]  - Option 3: Catrina (Dr. Guerrero' Nurse): [phone]    2.   Plan to return to clinic in 2-3 months with no hearing test    3. Order placed for MRA, please schedule this today.     4. Given migraine handout      Catrina RN, BSN, PHN  ealth - Otolaryngology

## 2021-08-30 ENCOUNTER — ANCILLARY PROCEDURE (OUTPATIENT)
Dept: MRI IMAGING | Facility: CLINIC | Age: 76
End: 2021-08-30
Attending: OTOLARYNGOLOGY
Payer: MEDICARE

## 2021-08-30 DIAGNOSIS — R42 VERTIGO: ICD-10-CM

## 2021-08-30 PROCEDURE — 70549 MR ANGIOGRAPH NECK W/O&W/DYE: CPT | Mod: MC | Performed by: RADIOLOGY

## 2021-08-30 PROCEDURE — 70544 MR ANGIOGRAPHY HEAD W/O DYE: CPT | Mod: MC | Performed by: RADIOLOGY

## 2021-08-30 PROCEDURE — A9585 GADOBUTROL INJECTION: HCPCS | Performed by: RADIOLOGY

## 2021-08-30 RX ORDER — GADOBUTROL 604.72 MG/ML
7.5 INJECTION INTRAVENOUS ONCE
Status: COMPLETED | OUTPATIENT
Start: 2021-08-30 | End: 2021-08-30

## 2021-08-30 RX ADMIN — GADOBUTROL 7 ML: 604.72 INJECTION INTRAVENOUS at 17:47

## 2021-08-30 NOTE — DISCHARGE INSTRUCTIONS
MRI Contrast Discharge Instructions    The IV contrast you received today will pass out of your body in your  urine. This will happen in the next 24 hours. You will not feel this process.  Your urine will not change color.    Drink at least 4 extra glasses of water or juice today (unless your doctor  has restricted your fluids). This reduces the stress on your kidneys.  You may take your regular medicines.    If you are on dialysis: It is best to have dialysis today.    If you have a reaction: Most reactions happen right away. If you have  any new symptoms after leaving the hospital (such as hives or swelling),  call your hospital at the correct number below. Or call your family doctor.  If you have breathing distress or wheezing, call 911.    Special instructions: ***    I have read and understand the above information.    Signature:______________________________________ Date:___________    Staff:__________________________________________ Date:___________     Time:__________    [facility] Departments:    ___[place]: [phone]  ___[facility]: [phone]  ___[place]: [phone] ___[facility]: [phone]  ___[facility]: [phone]  ___[facility]: [phone]  ___[phone]  ___[facility]: [phone]  ___[phone]

## 2021-09-02 NOTE — RESULT ENCOUNTER NOTE
Please call with results.  The vascular studies were normal, which is reassuring.    Inez Guerrero MD

## 2021-09-03 ENCOUNTER — PATIENT OUTREACH (OUTPATIENT)
Dept: OTOLARYNGOLOGY | Facility: CLINIC | Age: 76
End: 2021-09-03

## 2021-09-03 NOTE — PROGRESS NOTES
"Spoke with Tameka regarding MRA brain and MRA neck results. See impressions below:    MRA BRAIN:    \"Findings:   No aneurysm or stenosis of the major intracranial arteries.  Impression:  No intracranial arterial aneurysm or stenosis.\"    MRA NECK:   \"Findings:   Patent major cervical arteries. The normal distal right internal  carotid artery measures 4.4 mm. The normal distal left internal  carotid artery measures 4.6 mm. Antegrade flow in the major cervical  vasculature.  Impression:  Patent major cervical arteries without aneurysm or stenosis.\"    To see full report, please see MRA brain and MRA neck completed on 8/30/21.     Patient pleased to hear of these results and will follow up in 3 months as planned. Patient has no further questions at this time.    Maryjane Glass RN on 9/3/2021 at 4:40 PM    "

## 2021-09-04 ENCOUNTER — HEALTH MAINTENANCE LETTER (OUTPATIENT)
Age: 76
End: 2021-09-04

## 2021-10-25 NOTE — CONSULTS
[facility], [place]   General Infectious Disease Consultation     Patient:  Tameka Hebert, Date of birth 1945, Medical record number [id number]  Date of Visit:  July 25, 2017  Date of Admission: 7/25/2017  Consult Requester:Vanessa Cuello MD            Assessment and Recommendations:   ASSESSMENT:  1. Febrile illness of unknown etiology, onset 7/15. Recent history significant for outdoor activities in [place] and [place]. Infectious work-up prior to admission negative for: Anaplasmosis, Ehrlichiosis, Babesiosis, EBV, CMV, HIV, West Nile Virus, HAV, HBV, HCV, acute Parvovirus B19 infection, and urinary tract infection. 7/25 CXR negative for PNA. 7/26 MR-abdomen negative for obvious abscess and/or obvious intra-abdominal malignancy. 7/21 Blood cultures X2 NGTD.   2. Probable subacute Cholecystitis -- Elevated liver enzymes (, , ) and hyperbilirubinemia (Tbili 2.4) in conjunction with 7/26 MR-abdomen findings concerning for ascending cholangitis vs other inflammatory hepatobiliary pathology: cholelithiasis with hyperemic gall bladder mucosa, moderate pericholecystic inflammatory changes and fluid, no obstructing stone identified, no GB distention, hepatomegaly with scattered cysts, no intra/extra-hepatic biliary dilatation. Additionally, the 7/21 RUQ-US showed 2 probable gallbladder polyps, likely incidental. Physical exam is not consistent with acute cholescystitis; however, MRI findings are clearly abnormal (as are LFTs), thus targeting enteric sammi is reasonable.   A   3. Hematologic abnormalities. OSH laboratory work showed leukopenia with absolute lymphopenia and thrombocytopenia, now resolved. Post-admission, patient exhibits normocytic, normochromic anemia.  4. Elevated CRP 77 mg/L with mildly elevated procalcitonin 0.54 ng/mL = consistent with a bacterial process and not compatible with viral etiology.  5. H/o intermittent rigors -- suggestive of  possible/probable intermittent bacteremia.   6.  Negative for babesiosis x2 here and at [facility].    RECOMMENDATION:  1. Discontinue doxycycline given that the OSH infectious work-up was negative for Anaplasmosis -- and symptoms beyond 24 hours = not anaplasmosis. Lyme Screen neg.  2. Start ciprofloxacin 400mg IV q12h for excellent bile concentration and coverage of Enterobacteriaceae and some Enterococci in the setting of suspected hepatobiliary pathology and concern for ascending infection  3. Trend CRP daily.   4. Plan to re-evaluate. Will consider addition of metronidazole for anaerobic coverage in the near future (i.e. Tomorrow) but in the presence of nausea/vomiting, this is not critical at the moment.   5. ID would rec to defer any HIDA or other scans.  She does not appear septic or with acute cholescystitis, thus coupled with MRI findings, unlikely to have obstruction.  There could be ball-valving with a gallstone causing intermittent obstruction but that may not be detected by HIDA scan.    5. If there is no response with ongoing fevers and no change in CRP, then escalation of antimicrobial therapy (e.g. Zosyn 3.375g IV Q6Hr) would be the next step; however, as she is stable (and this has been ongoing for ~10days), ID would prefer a regimen that can easily be transitioned to outpatient therapy. Salmonella resistant to cipro is a possibility.  There is a multistate outbreak of Salmonella associated with papayas.       DISCUSSION        The patient exhibits a fevers of unknown origin. While her outdoor activity history and laboratory results appear to support tick-borne illness, the infectious work-up has been negative thus far. However, RMSF serologies were reportedly sought by the PCP but the results are not available. The most common etiologies of fever of unknown origin include: infections, malignancies, and connective tissue diseases. Among infections, common etiologies include: TB, occult abscesses,  "osteomyelitis, bacterial endocarditis, tick-borne illnesses. There are myriad less common infectious etiologies. Regarding connective tissue diseases, common etiologies include vasculitis, SLE, polymyalgia rheumatica, giant cell arteritis, adult still's disease. Regarding malignancies, common etiologies include: lymphoma (adelso non-Hodgkin's), leukemia, renal cell carcinoma, and hepatocellular carcinoma or other tumors metastatic to the liver. Medications commonly implicated in \"drug fever\" include: antimicrobials (sulfonamides, penicillins, nitrofurantoin, vancomycin, antimalarials), H1 and H2 antihistamines, antiepileptic drugs (barbs and phenytoin), iodides, NSAIDs (including salicylates), antihypertensives (hydralazine, methyldopa), antiarrhythmics (quinidine, procainamide), and antithyroid drugs. Less common causes of fever of unknown origin include: disordered heat homeostasis, dental abscesses, and uncommon infections (eg, Q fever, leptospirosis, psittacosis, tularemia, meliodosis, syphilis, disseminated gonnococcemia, chronic meningococcemia, visceral leishmaniasis, Whipple's disease, and yersiniosis). Finally, additional non-infectious causes of fever include: VTE, hematoma, hyperthyroidism, pheochromocytoma, adrenal insufficiency, and hereditary periodic fever syndromes.         If further investigation regarding hepatobiliary pathology does not yield an etiology, then will consider the following tests: RMSF serologies, [place] tick fever virus culture and RT-PCR, interferon-gamma release assay, fungal blood cultures, rheumatoid factor, serum ferritin, creatine phosphokinase, antinuclear antibodies, SPEP, CT chest, lumbar puncture, histoplasma serologies, histoplasma urine and blood antigen testing, serum T. pallidum antibodies, serum (1,3)-Beta-D-Glucan, serum Aspergillus Galactomannan antigen, Toxoplasma serologies, echocardiogram.      " "________________________________________________________________    Scribe Disclosure:   I, Juan Redd, am serving as a scribe; to document services personally performed by Colton Liu -based on data collection and the provider's statements to me.         ________________________________________________________________    Consult Question:  Admission Diagnosis: fever   elevated billirubin  Fever         History of Present Illness:     Tameka Hebert is a 73 yo F with an unremarkable PMH who was admitted to [facility] on 7/25 at the behest of her PCP. Reportedly, the patient's symptoms began on 7/15 shortly after retuning from a hiking trip in [place] from 7/8-7/15. The patient reports occipital HAs, increased fatigue, chills, and fevers (Tmax 102) that occur every 4-6 hours \"like clockwork.\" Moreover, she reports myalgias of the lower extremities bilaterally, neck, and shoulders. Additionally, the patient reports visiting [place] 6/9-6/23 where she removed ticks from others but denies getting bitten herself. She denies other insect bites, rash, sick contacts, and eating undercooked meats. Moreover, she denies diarrhea, hematochezia, abdominal pain, cough, CP, SOB, jaw pain/fatigue, transient vision loss. She denies having pets at home, living on or near a farm, taking any outpatient medications,     On 7/17, the patient visited her PCP who ordered a CBC that showed absolute lymphopenia:   WC 4.8, Lymphocytes 5.6%, Absolute Lymphocytes 0.3    On 7/20, she returned to her PCP who started the patient on doxycycline for 10 days duration for presumed tick-borne illness.  Moreover, the following tests were ordered, which show elevated liver enzymes:  Lyme screen with reflex west blot: negative  Anaplasma phagocytophilum PCR: negative  Ehrlichia  chaffeensis PCR: negative  Ehrlichia ewingii/canis PCR: negative  Ehrlichia muris-like PCR: negative  Heterophile antibodies: negative   Liver chemistry: , , ALP " 465, Tbili 0.6    On , the patient produced non-bilious, non-bloody emesis which prompted a visit to [facility]. The following tests were ordered which show leukopenia with absolute lymphopenia, thrombocytopenia, mild hyponatremia, elevated liver enzymes, elevated direct bilirubin, and hypoalbuminemia:  Blood cultures X2: no growth on   Mononucleosis screen: negative  Anaplasma phagocytophilum PCR- negative   Ehrlichia  chaffeensis PCR: negative  Ehrlichia ewingii/canis PCR: negative  Ehrlichia muris-like PCR: negative  Babesia species PCR: negative  Babesia microti PCR: negative  West Nile virus Ig.06 - negative  West Nile virus IgM 0.00 - negative  CBC: WBC 3.4, Plts 124, Absolute lymphocytes 0.3  CMP: Sodium 132, Albumin 2.9  Liver chemistry: , , , Tbili 0.9, Direct bili 0.7  UA - urobilinogen 2.0 (H)  RUQ US - 2 probable gallbladder polyps likely incidental, no stones, and septated left hepatic lobe cyst incidental    On , the patient visited her PCP who ordered the following tests which show evidence of previous Parvovirus B19 infection, absolute lymphopenia, elevated liver enzymes suggestive of a cholestatic pattern, and direct hyperbilirubinemia:  Peripheral blood smear: lymphopenia without evidence of etiology; no findings of neoplastic process; no infectious organisms identified   HAV IgM: negative  HBV-surface ag: negative  HBV-IgM: negative  HCV antibody: negative  HIV-1/HIV-2 antibody: negative  HIV-1 p24: negative  Parvovirus B19 Ig.78 H - positive   Parvovirus B19 IgM: 0.12 - negative  CBC: WBC 4.6, Lymphocytes 8.6 L, Absolute lymphocytes 0.4 L  Liver chemistry: , , , Tbili 2.5, Direct bili 2.0   **RMSF IgM reportedly ordered but results not available**    On , the patient was admitted to [facility] at the behest of her PCP. Since admission, the patient has been intermittently febrile (Tmax 100.7) and normotensive. She exhibits WC of 5.5  "with resolution of lymphopenia, NC/NC anemia (11.5), elevated CRP (77), procalcitonin 0.64, elevated liver enzymes (, , ), hyperbilirubinemia (Tbili 2.4), and hypoalbuminemia (2.0). 7/25 CXR negative for infiltrates, consolidation, acute processes. 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 7/26 Blood culture X1 and parasite stain pending. 7/26 is day 7 of doxycycline; the patient reports that she missed her morning dose on 7/25.     Last night, the patient reports experiencing chills, night sweats, and vomiting. However, she reports that she was able eat a little bit for the first time last night in several days. This morning, she reports feeling \"terrible,\" \"achy\" in the legs and neck, and describes intermittent occipital HAs. She also reports an episode of emesis. She denies SOB, CP, cough, abdominal pain.              Review of Systems:   CONSTITUTIONAL:  Positive for fevers, chills, and diaphoresis  EYES: negative for icterus  ENT:  negative for hearing loss, tinnitus and sore throat  RESPIRATORY:  negative for cough with sputum and dyspnea  CARDIOVASCULAR:  negative for chest pain, dyspnea  GASTROINTESTINAL:  Positive for nausea and vomiting. negative for diarrhea and constipation  GENITOURINARY:  negative for dysuria  HEME:  No easy bruising  INTEGUMENT:  negative for rash and pruritus  NEURO:  Negative for headache           Past Medical History:   No past medical history on file.         Past Surgical History:   No past surgical history on file.         Family History:   No family history on file. No immunocompromise states.         Social History:     Social History   Substance Use Topics     Smoking status: Not on file     " "Smokeless tobacco: Not on file     Alcohol use Not on file            Current Medications (antimicrobials listed in bold):       sodium chloride (PF)  3 mL Intracatheter Q8H     sodium chloride (PF)  3 mL Intracatheter Q8H     doxycycline (VIBRAMYCIN) capsule 100 mg  100 mg Oral BID     sodium chloride (PF)  60 mL Intravenous Q8H            Allergies:     Allergies   Allergen Reactions     Codeine Nausea and Vomiting            Physical Exam:   Vitals were reviewed  Patient Vitals for the past 24 hrs:   BP Temp Temp src Pulse Resp SpO2 Height Weight   07/25/17 1926 108/62 98.6  F (37  C) Oral 85 18 96 % - -   07/25/17 1626 116/65 100.7  F (38.2  C) Oral 89 18 96 % - -   07/25/17 1530 - 98.3  F (36.8  C) Oral - - - - -   07/25/17 1334 - 100.3  F (37.9  C) Oral - - - - -   07/25/17 1142 115/64 98.7  F (37.1  C) Axillary 86 18 96 % - -   07/25/17 0916 118/67 97.7  F (36.5  C) - 85 16 95 % 1.8 m (5' 10.87\") 65.7 kg (144 lb 13.5 oz)     Ranges for his vital signs:  Temp:  [97.7  F (36.5  C)-100.7  F (38.2  C)] 98.6  F (37  C)  Pulse:  [85-89] 85  Resp:  [16-18] 18  BP: (108-118)/(62-67) 108/62  SpO2:  [95 %-96 %] 96 %    Physical Examination:  GENERAL:  well-developed, well-nourished, in bed in no acute distress.  HEENT:  Head is normocephalic, atraumatic   EYES:  Eyes have anicteric sclerae without conjunctival injection or stigmata of endocarditis.    ENT:  Oropharynx is moist without exudates or ulcers. Tongue is midline  NECK:  Supple. No  Cervical lymphadenopathy  LUNGS:  Clear to auscultation bilateral.   CARDIOVASCULAR:  Regular rate and rhythm with no murmurs, gallops or rubs.  ABDOMEN:  Normal bowel sounds, soft, nontender. No appreciable hepatosplenomegaly. Negative Cortés's sign   SKIN:  No acute rashes. No stigmata of endocarditis.  NEUROLOGIC:  Grossly nonfocal. Active x4 extremities          Laboratory Data:     CRP Inflammation   Date Value Ref Range Status   07/25/2017 77.0 (H) 0.0 - 8.0 mg/L Final "     Creatinine   Date Value Ref Range Status   07/25/2017 0.50 (L) 0.52 - 1.04 mg/dL Final   10/08/2014 0.74 0.52 - 1.04 mg/dL Final   03/05/2014 0.77 0.52 - 1.04 mg/dL Final   02/26/2010 0.75 0.52 - 1.04 mg/dL Final     Comment:     New IDMS-traceable calibration  beginning 5/1/08 04/30/2007 0.63 0.60 - 1.30 mg/dL Final     WBC   Date Value Ref Range Status   07/25/2017 5.5 4.0 - 11.0 10e9/L Final   10/08/2014 4.1 4.0 - 11.0 10e9/L Final   03/05/2014 3.5 (L) 4.0 - 11.0 10e9/L Final   02/26/2010 4.9 4.0 - 11.0 10e9/L Final   04/30/2007 4.3 4.0 - 11.0 10e9/L Final     Hemoglobin   Date Value Ref Range Status   07/25/2017 11.5 (L) 11.7 - 15.7 g/dL Final     MCV   Date Value Ref Range Status   07/25/2017 88 78 - 100 fl Final     Platelet Count   Date Value Ref Range Status   07/25/2017 244 150 - 450 10e9/L Final     ALT   Date Value Ref Range Status   07/25/2017 350 (H) 0 - 50 U/L Final   02/26/2010 15 0 - 50 U/L Final     AST   Date Value Ref Range Status   07/25/2017 132 (H) 0 - 45 U/L Final   02/26/2010 35 0 - 45 U/L Final     Bilirubin Total   Date Value Ref Range Status   07/25/2017 2.8 (H) 0.2 - 1.3 mg/dL Final   02/26/2010 0.2 0.2 - 1.3 mg/dL Final     No results found for: CMVPCR, CMQNT, CMVQ, HSVSP, HSVPC, EBVDN  Lab Results   Component Value Date     07/25/2017    BUN 9 07/25/2017       Attending Physician Attestation:  I have seen and evaluated Tameka Hebert. I have discussed with the house staff team or resident(s), and I agree with the above documented findings and plan in this note. I have reviewed today's vital signs, medications, labs and imaging.  The medical student was involved in this note, they were serving in a capacity as a scribe.  Floor time: 60 minutes  Face-to-face time: 60 minutes  Total time: 120 minutes     Colton Liu MD MPH  [facility]   Division of Infectious Diseases & International Medicine  Department of Medicine            NULL

## 2021-10-30 ENCOUNTER — HEALTH MAINTENANCE LETTER (OUTPATIENT)
Age: 76
End: 2021-10-30

## 2022-02-19 ENCOUNTER — HEALTH MAINTENANCE LETTER (OUTPATIENT)
Age: 77
End: 2022-02-19

## 2022-10-22 ENCOUNTER — HEALTH MAINTENANCE LETTER (OUTPATIENT)
Age: 77
End: 2022-10-22

## 2023-09-19 ENCOUNTER — OFFICE VISIT (OUTPATIENT)
Dept: OPHTHALMOLOGY | Facility: CLINIC | Age: 78
End: 2023-09-19
Attending: OPHTHALMOLOGY
Payer: COMMERCIAL

## 2023-09-19 DIAGNOSIS — H16.403 CORNEAL NEOVASCULARIZATION OF BOTH EYES: Primary | ICD-10-CM

## 2023-09-19 DIAGNOSIS — H18.453 SALZMANN'S NODULAR DEGENERATION OF CORNEAS OF BOTH EYES: ICD-10-CM

## 2023-09-19 PROCEDURE — 99204 OFFICE O/P NEW MOD 45 MIN: CPT | Mod: GC | Performed by: OPHTHALMOLOGY

## 2023-09-19 PROCEDURE — G0463 HOSPITAL OUTPT CLINIC VISIT: HCPCS | Performed by: OPHTHALMOLOGY

## 2023-09-19 PROCEDURE — 92025 CPTRIZED CORNEAL TOPOGRAPHY: CPT | Performed by: OPHTHALMOLOGY

## 2023-09-19 RX ORDER — FLUOROMETHOLONE 0.1 %
SUSPENSION, DROPS(FINAL DOSAGE FORM)(ML) OPHTHALMIC (EYE)
COMMUNITY
Start: 2023-08-11

## 2023-09-19 RX ORDER — CYCLOSPORINE 0.5 MG/ML
1 EMULSION OPHTHALMIC
COMMUNITY
Start: 2023-08-11

## 2023-09-19 ASSESSMENT — VISUAL ACUITY
CORRECTION_TYPE: GLASSES
OS_CC: 20/30
OD_CC+: -1
OS_CC+: +2
OD_CC: 20/20
METHOD: SNELLEN - LINEAR

## 2023-09-19 ASSESSMENT — CONF VISUAL FIELD
OS_INFERIOR_TEMPORAL_RESTRICTION: 0
OD_SUPERIOR_TEMPORAL_RESTRICTION: 0
METHOD: COUNTING FINGERS
OD_SUPERIOR_NASAL_RESTRICTION: 0
OS_SUPERIOR_TEMPORAL_RESTRICTION: 0
OS_INFERIOR_NASAL_RESTRICTION: 0
OS_SUPERIOR_NASAL_RESTRICTION: 0
OD_INFERIOR_NASAL_RESTRICTION: 0
OD_INFERIOR_TEMPORAL_RESTRICTION: 0
OS_NORMAL: 1
OD_NORMAL: 1

## 2023-09-19 ASSESSMENT — REFRACTION_WEARINGRX
OD_SPHERE: PLANO
OD_CYLINDER: +3.00
OD_ADD: +2.50
OS_AXIS: 040
OS_SPHERE: +1.00
OS_CYLINDER: +3.75
OS_ADD: +2.50
OD_AXIS: 140

## 2023-09-19 ASSESSMENT — TONOMETRY
OS_IOP_MMHG: 17
IOP_METHOD: TONOPEN
OD_IOP_MMHG: 16

## 2023-09-19 ASSESSMENT — SLIT LAMP EXAM - LIDS
COMMENTS: NORMAL
COMMENTS: NORMAL

## 2023-09-19 ASSESSMENT — EXTERNAL EXAM - RIGHT EYE: OD_EXAM: NORMAL

## 2023-09-19 ASSESSMENT — EXTERNAL EXAM - LEFT EYE: OS_EXAM: NORMAL

## 2023-09-19 NOTE — NURSING NOTE
Chief Complaints and History of Present Illnesses   Patient presents with    New Patient     Chief Complaint(s) and History of Present Illness(es)       New Patient              Laterality: both eyes    Associated symptoms: dryness and floaters.  Negative for double vision and photophobia    Treatments tried: eye drops and artificial tears    Pain scale: 0/10              Comments    Patient reports after being dx with michele nodules a year ago, vision has noticeably decreased in both distance and near, LE> RE.   Went to [facility] for vision changes 2 mo ago, where was put on a few drops and shortly after started experiencing joint pain and light sensitivity, discontinued and wanted  a second professional opinion.   Current Ocular Meds:  PF AT's 1-2x daily  Cyclosporine 1 drop in each eye per day  FML 1 drop in each eye per day    MAOG GOODMAN, September 19, 2023

## 2023-09-19 NOTE — PROGRESS NOTES
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 Saw optometrist for regular eye exam Summer 2022, was told she had these salzmann nodules. Referred to outside ophthalmologist (Dr. Raya) for her and was and was started on Restasis BID OU, FML daily OU, doxycyline 50mg daily, lid hygiene, ATs 2-10x daily. Patient did not like the frequent drop regimen. Spoke with her daughter who is a heme/onc physician at the  who recommended she see a cornea specialist here.    09/19/23: Patient states that she has noticed slowly progressing blurry vision in both eyes. Especially notices more difficulty with reading. Last saw Dr. Raya about 6 weeks ago. Here for second opinion as she was not happy with frequent drop and medication regimen. She uses drops as below. She does not have any pain, irritation, redness, burning. Occasionally has some itching.     Drops Currently Taking   ATs 1-2x daily  FML once daily or once every other day  Restasis 1x daily      Chief Complaint(s) and History of Present Illness(es)       New Patient    In both eyes.  Associated symptoms include dryness and floaters.  Negative for double vision and photophobia.  Treatments tried include eye drops and artificial tears.  Pain was noted as 0/10.             Comments    Patient reports after being dx with michele nodules a year ago, vision has noticeably decreased in both distance and near, LE> RE.   Went to [facility] for vision changes 2 mo ago, where was put on a few drops and shortly after started experiencing joint pain and light sensitivity, discontinued and wanted  a second professional opinion.   Current Ocular Meds:  PF AT's 1-2x daily  Cyclosporine 1 drop in each eye per day  FML 1 drop in each eye per day    MAGO GOODMAN, September 19, 2023                       Past ocular history   Prior eye surgery/laser/Trauma: None  CTL wearer:No   Glasses : Bifocals  Family  Hx of eye disease: None    PMH     Past Medical History:   Diagnosis Date    Fever 07/25/2017    Osteopenia     Vertigo        PSH     Past Surgical History:   Procedure Laterality Date    [place] UNLISTED PROCEDURE LARYNX  1980    nodules taken off    SHOULDER OPEN ROTATOR CUFF REPAIR  09/29/2016    Dr Aguila    TUBAL LIGATION         Meds     Current Outpatient Medications   Medication    cholecalciferol (VITAMIN D3) 25 mcg (1000 units) capsule    cycloSPORINE (RESTASIS) 0.05 % ophthalmic emulsion    fluorometholone (FML LIQUIFILM) 0.1 % ophthalmic suspension    magnesium 200 MG TABS    Turmeric Curcumin 500 MG CAPS    magnesium sulfate 3 g    potassium chloride (KLOR-CON) 20 MEQ Packet     No current facility-administered medications for this visit.       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  -     Plan  - PFAT each eye 2 - 4 times a day.  - Continue FML daily both eyes  - Continue restasis BID both eyes    Follow up: 3 months      Regina Pérez MD  Resident Physician, PGY-3  Department of Ophthalmology    Attending Physician Attestation:  Complete documentation of historical and exam elements from today's encounter can be found in the full encounter summary report (not reduplicated in this progress note).  I personally obtained the chief complaint(s) and history of present illness.  I confirmed and edited as necessary the review of systems, past medical/surgical history, family history, social history, and examination findings as documented by others; and I examined the patient myself.  I personally reviewed the relevant tests, images, and reports as documented above.  I formulated and edited as necessary the assessment and plan and discussed the  findings and management plan with the patient and family. - Jorge Bacon MD      I personally reviewed the ophthalmic test(s) associated with this encounter, agree with the interpretation(s) as documented by the resident/fellow, and have edited the corresponding report(s) as necessary. Jorge Bacon MD

## 2023-09-26 ENCOUNTER — TELEPHONE (OUTPATIENT)
Dept: OPHTHALMOLOGY | Facility: CLINIC | Age: 78
End: 2023-09-26
Payer: COMMERCIAL

## 2023-10-12 ENCOUNTER — TELEPHONE (OUTPATIENT)
Dept: OPHTHALMOLOGY | Facility: CLINIC | Age: 78
End: 2023-10-12
Payer: COMMERCIAL

## 2023-10-17 ENCOUNTER — TELEPHONE (OUTPATIENT)
Dept: OPHTHALMOLOGY | Facility: CLINIC | Age: 78
End: 2023-10-17
Payer: COMMERCIAL

## 2024-01-25 ENCOUNTER — OFFICE VISIT (OUTPATIENT)
Dept: OPHTHALMOLOGY | Facility: CLINIC | Age: 79
End: 2024-01-25
Attending: OPHTHALMOLOGY
Payer: COMMERCIAL

## 2024-01-25 DIAGNOSIS — H25.013 CORTICAL SENILE CATARACT OF BOTH EYES: ICD-10-CM

## 2024-01-25 DIAGNOSIS — H16.403 CORNEAL NEOVASCULARIZATION OF BOTH EYES: ICD-10-CM

## 2024-01-25 DIAGNOSIS — H18.453 SALZMANN'S NODULAR DEGENERATION OF CORNEAS OF BOTH EYES: Primary | ICD-10-CM

## 2024-01-25 PROCEDURE — 99214 OFFICE O/P EST MOD 30 MIN: CPT | Mod: GC | Performed by: OPHTHALMOLOGY

## 2024-01-25 PROCEDURE — G0463 HOSPITAL OUTPT CLINIC VISIT: HCPCS | Performed by: OPHTHALMOLOGY

## 2024-01-25 ASSESSMENT — VISUAL ACUITY
CORRECTION_TYPE: GLASSES
OS_CC+: -1
METHOD: SNELLEN - LINEAR
OD_CC: 20/30
OD_CC+: -2
OS_CC: 20/40

## 2024-01-25 ASSESSMENT — TONOMETRY
OS_IOP_MMHG: 19
IOP_METHOD: ICARE
OD_IOP_MMHG: 16

## 2024-01-25 ASSESSMENT — EXTERNAL EXAM - LEFT EYE: OS_EXAM: NORMAL

## 2024-01-25 ASSESSMENT — SLIT LAMP EXAM - LIDS
COMMENTS: NORMAL
COMMENTS: NORMAL

## 2024-01-25 ASSESSMENT — REFRACTION_WEARINGRX
OD_AXIS: 140
OD_CYLINDER: +3.00
OD_SPHERE: PLANO
OS_ADD: +2.50
OS_CYLINDER: +3.75
OS_SPHERE: +1.00
OS_AXIS: 040
OD_ADD: +2.50

## 2024-01-25 ASSESSMENT — EXTERNAL EXAM - RIGHT EYE: OD_EXAM: NORMAL

## 2024-01-25 NOTE — PROGRESS NOTES
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 Saw optometrist for regular eye exam Summer 2022, was told she had these salzmann nodules. Referred to outside ophthalmologist (Dr. Raya) for her and was and was started on Restasis BID OU, FML daily OU, doxycyline 50mg daily, lid hygiene, ATs 2-10x daily. Patient did not like the frequent drop regimen. Spoke with her daughter who is a heme/onc physician at the  who recommended she see a cornea specialist here.    09/19/23: Patient states that she has noticed slowly progressing blurry vision in both eyes. Especially notices more difficulty with reading. Last saw Dr. Raya about 6 weeks ago. Here for second opinion as she was not happy with frequent drop and medication regimen. She uses drops as below. She does not have any pain, irritation, redness, burning. Occasionally has some itching.     Drops Currently Taking   ATs 1-2x daily  FML once daily or once every other day  Restasis 1x daily      Chief Complaint(s) and History of Present Illness(es)       New Patient    In both eyes.  Associated symptoms include dryness and floaters.  Negative for double vision and photophobia.  Treatments tried include eye drops and artificial tears.  Pain was noted as 0/10.             Comments    Patient reports after being dx with michele nodules a year ago, vision has noticeably decreased in both distance and near, LE> RE.   Went to [facility] for vision changes 2 mo ago, where was put on a few drops and shortly after started experiencing joint pain and light sensitivity, discontinued and wanted  a second professional opinion.   Current Ocular Meds:  PF AT's 1-2x daily  Cyclosporine 1 drop in each eye per day  FML 1 drop in each eye per day    MAGO GOODMAN, September 19, 2023                       Past ocular history   Prior eye surgery/laser/Trauma: None  CTL wearer:No   Glasses : Bifocals  Family  Hx of eye disease: None    PMH     Past Medical History:   Diagnosis Date    Fever 07/25/2017    Osteopenia     Vertigo        PSH     Past Surgical History:   Procedure Laterality Date    [place] UNLISTED PROCEDURE LARYNX  1980    nodules taken off    SHOULDER OPEN ROTATOR CUFF REPAIR  09/29/2016    Dr Aguila    TUBAL LIGATION         Meds     Current Outpatient Medications   Medication    cholecalciferol (VITAMIN D3) 25 mcg (1000 units) capsule    cycloSPORINE (RESTASIS) 0.05 % ophthalmic emulsion    fluorometholone (FML LIQUIFILM) 0.1 % ophthalmic suspension    magnesium 200 MG TABS    magnesium sulfate 3 g    potassium chloride (KLOR-CON) 20 MEQ Packet    Turmeric Curcumin 500 MG CAPS     No current facility-administered medications for this visit.       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  -     Plan  - PFAT each eye 2 - 4 times a day.  - Continue FML daily both eyes  - Continue restasis BID both eyes    Follow up: 3 months      Regina Pérez MD  Resident Physician, PGY-3  Department of Ophthalmology    Attending Physician Attestation:  Complete documentation of historical and exam elements from today's encounter can be found in the full encounter summary report (not reduplicated in this progress note).  I personally obtained the chief complaint(s) and history of present illness.  I confirmed and edited as necessary the review of systems, past medical/surgical history, family history, social history, and examination findings as documented by others; and I examined the patient myself.  I personally reviewed the relevant tests, images, and reports as documented above.  I formulated and edited as necessary the assessment and plan and discussed the  findings and management plan with the patient and family. - Jorge Bacon MD      I personally reviewed the ophthalmic test(s) associated with this encounter, agree with the interpretation(s) as documented by the resident/fellow, and have edited the corresponding report(s) as necessary. Jorge Bacon MD

## 2024-01-25 NOTE — NURSING NOTE
Chief Complaints and History of Present Illnesses   Patient presents with    Follow Up     Chief Complaint(s) and History of Present Illness(es)       Follow Up              Laterality: both eyes    Course: stable    Associated symptoms: Negative for eye pain, flashes and floaters    Treatments tried: artificial tears              Comments    Here for follow up. VA is about the same. No flashes or floaters. No eye pain. Has not been using the FML often.    Momo Hobbs COT 1:32 PM January 25, 2024

## 2024-08-11 ENCOUNTER — HEALTH MAINTENANCE LETTER (OUTPATIENT)
Age: 79
End: 2024-08-11

## 2024-10-08 ENCOUNTER — OFFICE VISIT (OUTPATIENT)
Dept: OPHTHALMOLOGY | Facility: CLINIC | Age: 79
End: 2024-10-08
Payer: COMMERCIAL

## 2024-10-08 DIAGNOSIS — H18.453 SALZMANN'S NODULAR DEGENERATION OF CORNEAS OF BOTH EYES: Primary | ICD-10-CM

## 2024-10-08 DIAGNOSIS — H16.403 CORNEAL NEOVASCULARIZATION OF BOTH EYES: ICD-10-CM

## 2024-10-08 DIAGNOSIS — H25.813 COMBINED FORMS OF AGE-RELATED CATARACT OF BOTH EYES: ICD-10-CM

## 2024-10-08 PROCEDURE — 92025 CPTRIZED CORNEAL TOPOGRAPHY: CPT | Performed by: OPHTHALMOLOGY

## 2024-10-08 PROCEDURE — 99213 OFFICE O/P EST LOW 20 MIN: CPT | Performed by: OPHTHALMOLOGY

## 2024-10-08 ASSESSMENT — VISUAL ACUITY
OD_CC: 20/25
METHOD: SNELLEN - LINEAR
OS_CC+: -3
CORRECTION_TYPE: GLASSES
OD_CC+: -1
OS_CC: 20/20

## 2024-10-08 ASSESSMENT — CONF VISUAL FIELD
OD_NORMAL: 1
OS_NORMAL: 1
OS_INFERIOR_NASAL_RESTRICTION: 0
OD_SUPERIOR_NASAL_RESTRICTION: 0
OD_SUPERIOR_TEMPORAL_RESTRICTION: 0
OS_SUPERIOR_TEMPORAL_RESTRICTION: 0
OD_INFERIOR_TEMPORAL_RESTRICTION: 0
OS_SUPERIOR_NASAL_RESTRICTION: 0
OS_INFERIOR_TEMPORAL_RESTRICTION: 0
OD_INFERIOR_NASAL_RESTRICTION: 0
METHOD: COUNTING FINGERS

## 2024-10-08 ASSESSMENT — REFRACTION_WEARINGRX
OS_AXIS: 040
OD_SPHERE: PLANO
OD_AXIS: 140
OD_CYLINDER: +3.00
OS_SPHERE: +1.00
OD_ADD: +2.50
OS_CYLINDER: +3.75
OS_ADD: +2.50

## 2024-10-08 ASSESSMENT — TONOMETRY
OD_IOP_MMHG: 17
OS_IOP_MMHG: 15
IOP_METHOD: ICARE

## 2024-10-08 ASSESSMENT — EXTERNAL EXAM - RIGHT EYE: OD_EXAM: NORMAL

## 2024-10-08 ASSESSMENT — EXTERNAL EXAM - LEFT EYE: OS_EXAM: NORMAL

## 2024-10-08 ASSESSMENT — SLIT LAMP EXAM - LIDS
COMMENTS: NORMAL
COMMENTS: NORMAL

## 2024-10-08 NOTE — PROGRESS NOTES
HPI       Cataract Evaluation    In both eyes.  Associated symptoms include blurred vision and glare.  Treatments tried include artificial tears.             Comments    Here for second opinion on cataracts. Seen at [facility] with general and cornea specialist. Per consults, cornea to be addressed prior to cataracts sx. No pain. Stable floaters without flashes. Uses AT as needed.    Momo Hobbs COT 8:49 AM October 8, 2024             Last edited by Momo Hobbs on 10/8/2024  8:49 AM.         Review of systems for the eyes was negative other than the pertinent positives/negatives listed in the HPI.      Assessment & Plan    HPI:  Tameka Hebert is a 79 year old female with history of salzmann's nodules, high cylinder, presents from [facility] for second opinion. She was seen by Dr. Cannon 9/19/23 and 1/25/24 with KNV and Salzmann's nodules. She is considering superficial keratectomy and salzmann nodule removal. Notes visual obscurations worse left eye       POHx:  PMHx:  Current Medications:   Current Outpatient Medications   Medication Sig Dispense Refill    cholecalciferol (VITAMIN D3) 25 mcg (1000 units) capsule 1000 iu daily in summer, 2000 IU daily in winter      cycloSPORINE (RESTASIS) 0.05 % ophthalmic emulsion Apply 1 drop to eye      fluorometholone (FML LIQUIFILM) 0.1 % ophthalmic suspension SHAKE LIQUID AND INSTILL 1 DROP IN BOTH EYES EVERY DAY      magnesium 200 MG TABS Take 25 mg by mouth      magnesium sulfate 3 g Magnesium      potassium chloride (KLOR-CON) 20 MEQ Packet Take 20 mEq by mouth daily 5 tablet 0    Turmeric Curcumin 500 MG CAPS Take 1,000 mg by mouth       No current facility-administered medications for this visit.     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 No treatment currently recommended. The patient will monitor for vision changes and contact us with any decrease in vision. Recheck next visit       No follow-ups on file.        Cornel Nichols MD     Attending Physician Attestation:  Complete documentation of historical and exam elements from today's encounter can be found in the full encounter summary report (not reduplicated in this progress note).  I personally obtained the chief complaint(s) and history of present illness.  I confirmed and edited as necessary the review of systems, past medical/surgical history, family history, social history, and examination findings as documented by others; and I examined the patient myself.  I personally reviewed the relevant tests, images, and reports as documented above.  I formulated and edited as necessary the assessment and plan and discussed the findings and management plan with the patient and family. - Cornel Nichols MD

## 2024-10-16 ENCOUNTER — OFFICE VISIT (OUTPATIENT)
Dept: OPHTHALMOLOGY | Facility: CLINIC | Age: 79
End: 2024-10-16
Attending: OPHTHALMOLOGY
Payer: COMMERCIAL

## 2024-10-16 DIAGNOSIS — H18.453 SALZMANN'S NODULAR DEGENERATION OF CORNEAS OF BOTH EYES: Primary | ICD-10-CM

## 2024-10-16 PROCEDURE — 99213 OFFICE O/P EST LOW 20 MIN: CPT | Performed by: OPHTHALMOLOGY

## 2024-10-16 PROCEDURE — G0463 HOSPITAL OUTPT CLINIC VISIT: HCPCS | Performed by: OPHTHALMOLOGY

## 2024-10-16 PROCEDURE — 92025 CPTRIZED CORNEAL TOPOGRAPHY: CPT | Performed by: OPHTHALMOLOGY

## 2024-10-16 RX ORDER — MECLIZINE HYDROCHLORIDE 25 MG/1
25 TABLET ORAL
COMMUNITY
Start: 2024-09-20

## 2024-10-16 RX ORDER — MELOXICAM 15 MG/1
15 TABLET ORAL
COMMUNITY
Start: 2024-09-09

## 2024-10-16 ASSESSMENT — REFRACTION_WEARINGRX
OS_ADD: +2.50
OS_AXIS: 040
OS_CYLINDER: +3.75
OS_SPHERE: +1.00
OD_ADD: +2.50
OD_SPHERE: PLANO
OD_AXIS: 140
OD_CYLINDER: +3.00

## 2024-10-16 ASSESSMENT — TONOMETRY
IOP_METHOD: ICARE
OD_IOP_MMHG: 16
OS_IOP_MMHG: 15

## 2024-10-16 ASSESSMENT — SLIT LAMP EXAM - LIDS
COMMENTS: NORMAL
COMMENTS: NORMAL

## 2024-10-16 ASSESSMENT — VISUAL ACUITY
OS_CC: 20/25
OS_CC+: -2
CORRECTION_TYPE: GLASSES
METHOD: SNELLEN - LINEAR
OD_CC+: -2
OD_CC: 20/25

## 2024-10-16 ASSESSMENT — CONF VISUAL FIELD
OS_SUPERIOR_TEMPORAL_RESTRICTION: 0
METHOD: COUNTING FINGERS
OD_INFERIOR_NASAL_RESTRICTION: 0
OS_INFERIOR_NASAL_RESTRICTION: 0
OD_SUPERIOR_TEMPORAL_RESTRICTION: 0
OD_SUPERIOR_NASAL_RESTRICTION: 0
OD_INFERIOR_TEMPORAL_RESTRICTION: 0
OS_SUPERIOR_NASAL_RESTRICTION: 0
OD_NORMAL: 1
OS_INFERIOR_TEMPORAL_RESTRICTION: 0
OS_NORMAL: 1

## 2024-10-16 ASSESSMENT — EXTERNAL EXAM - RIGHT EYE: OD_EXAM: NORMAL

## 2024-10-16 ASSESSMENT — EXTERNAL EXAM - LEFT EYE: OS_EXAM: NORMAL

## 2024-10-16 NOTE — NURSING NOTE
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  She was seen recently in [place] and told that her nodules should be removed.  She is here for a second opinion.      She is also experiencing episodes of vertigo that are becoming more frequent.    PLACIDO Guillaume 8:18 AM  October 16, 2024

## 2024-10-16 NOTE — PROGRESS NOTES
Chief complaint   Here to get a second opinion about her cornea status and Dx    HPI    Tameka Hebert 79 year old female who was under the care of Dr. Bacon (last visit 1/25/2024) for Salzmann nodules vs pterygium vs contact lens induced corneal scar. Saw optometrist for regular eye exam Summer 2022, was told she had these salzmann nodules. Referred to outside ophthalmologist (Dr. Raya) for her and was and was started on Restasis BID OU, FML daily OU, doxycyline 50mg daily, lid hygiene, ATs 2-10x daily. Patient did not like the frequent drop regimen. Spoke with her daughter who is a heme/onc physician at the  who recommended she see a cornea specialist here.    09/19/23: Patient states that she has noticed slowly progressing blurry vision in both eyes. Especially notices more difficulty with reading. Last saw Dr. Raya about 6 weeks ago. Here for second opinion as she was not happy with frequent drop and medication regimen. She uses drops as below. She does not have any pain, irritation, redness, burning. Occasionally has some itching.      In her last visit (10/8/2024), saw Dr Nichols and was given only AT for Laney nodules.  Patient feels she can't see well out of the left eye they way she likes to.    Was given ATs 1-2x daily, FML once daily or once every other day, Restasis 1x daily by Dr. Bacon, however, she only has used AT.      Chief Complaint(s) and History of Present Illness(es)       Follow Up    In both eyes.  Since onset it is gradually worsening.  Associated symptoms include eye pain, photophobia, floaters and itching.  Negative for dryness.  Treatments tried include artificial tears.  Pain was noted as 2/10. 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  She was seen recently in [place] and told that her nodules should be removed.  She is here for a second  opinion.      She is also experiencing episodes of vertigo that are becoming more frequent.    Jennifer Luis, COT 8:18 AM  October 16, 2024                        Past ocular history   Prior eye surgery/laser/Trauma: None  CTL wearer:yes Hard contact lens for distance  Glasses : Yes x 30 years  Family Hx of eye disease: None    PMH     Past Medical History:   Diagnosis Date    Fever 07/25/2017    Osteopenia     Vertigo        PSH     Past Surgical History:   Procedure Laterality Date    [place] UNLISTED PROCEDURE LARYNX  1980    nodules taken off    SHOULDER OPEN ROTATOR CUFF REPAIR  09/29/2016    Dr Aguila    TUBAL LIGATION         Meds     Current Outpatient Medications   Medication Sig Dispense Refill    cholecalciferol (VITAMIN D3) 25 mcg (1000 units) capsule 1000 iu daily in summer, 2000 IU daily in winter      magnesium 200 MG TABS Take 25 mg by mouth      meclizine (ANTIVERT) 25 MG tablet Take 25 mg by mouth.      meloxicam (MOBIC) 15 MG tablet Take 15 mg by mouth.      cycloSPORINE (RESTASIS) 0.05 % ophthalmic emulsion Apply 1 drop to eye      fluorometholone (FML LIQUIFILM) 0.1 % ophthalmic suspension SHAKE LIQUID AND INSTILL 1 DROP IN BOTH EYES EVERY DAY      magnesium sulfate 3 g Magnesium      potassium chloride (KLOR-CON) 20 MEQ Packet Take 20 mEq by mouth daily 5 tablet 0    Turmeric Curcumin 500 MG CAPS Take 1,000 mg by mouth       No current facility-administered medications for this visit.       Labs       Imaging   Mike    Drops Currently Taking   AT 2x/week (due to position induced vertigo to put eye dros)    Assessment/Plan 10/16/2024   # michele nodules each eye  Visually significant causing irregular astigamtism on mike  Discussed Super K before having cataract sx  Discussed that sx is not necessary now but if she becomes more symptomatic we can removed the nodules with super K.  Patient would like to wait for now    # Fuchs dystrophy, each eye  - not visually significant      Follow up:  Oph: as  needed    Cornea and External Disease Fellow  Rachel carey MD      Attending Physician Attestation:  Complete documentation of historical and exam elements from today's encounter can be found in the full encounter summary report (not reduplicated in this progress note).  I personally obtained the chief complaint(s) and history of present illness.  I confirmed and edited as necessary the review of systems, past medical/surgical history, family history, social history, and examination findings as documented by others; and I examined the patient myself.  I personally reviewed the relevant tests, images, and reports as documented above.  I formulated and edited as necessary the assessment and plan and discussed the findings and management plan with the patient and family. I personally reviewed the ophthalmic test(s) associated with this encounter, agree with the interpretation(s) as documented by the resident/fellow, and have edited the corresponding report(s) as necessary.- Floridalma Delgado MD

## 2025-04-08 ENCOUNTER — TELEPHONE (OUTPATIENT)
Dept: OPHTHALMOLOGY | Facility: CLINIC | Age: 80
End: 2025-04-08
Payer: COMMERCIAL

## 2025-04-08 NOTE — TELEPHONE ENCOUNTER
Patient called in about possibly scheduling surgery. Pt used to see Dr. Sahni, and has seen Dr. Nichols and Dr. Delgado most recently in Oct. 2024. Pt wants to come in and discuss surgery options. Writer was in the middle of scheduling a clinic visit with Dr. Delgado, when pt asked if he has clinic in [place]. Writer advised that would be with Dr. Nichols if patient wants to be seen in [place]. Pt did prefer that. Tabr was able to schedule pt with Dr. Nichols in  on 4/15/25 at 1100. Starr Mayorga on 4/8/2025 at 11:03 AM

## 2025-04-18 ENCOUNTER — TRANSFERRED RECORDS (OUTPATIENT)
Dept: HEALTH INFORMATION MANAGEMENT | Facility: CLINIC | Age: 80
End: 2025-04-18
Payer: COMMERCIAL

## (undated) RX ORDER — MORPHINE SULFATE 2 MG/ML
INJECTION, SOLUTION INTRAMUSCULAR; INTRAVENOUS
Status: DISPENSED
Start: 2017-07-26